# Patient Record
Sex: FEMALE | Race: WHITE | NOT HISPANIC OR LATINO | Employment: STUDENT | URBAN - METROPOLITAN AREA
[De-identification: names, ages, dates, MRNs, and addresses within clinical notes are randomized per-mention and may not be internally consistent; named-entity substitution may affect disease eponyms.]

---
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
## 2019-02-18 ENCOUNTER — TELEPHONE (OUTPATIENT)
Dept: ENDOCRINOLOGY | Facility: CLINIC | Age: 19
End: 2019-02-18

## 2019-02-21 NOTE — TELEPHONE ENCOUNTER
[facility]    Phone Message    May a detailed message be left on voicemail: yes    Reason for Call: Other: pt's mother called and stated to give the pt a call back instead. Thanks.     Action Taken: Message routed to:  [facility]): Endo/Nina

## 2019-02-21 NOTE — TELEPHONE ENCOUNTER
[facility]    Phone Message    May a detailed message be left on voicemail: yes    Reason for Call: Other: Pt's mother, Mary called and requested a nurse to give her a call back. Please call back Mary. Thanks.     Action Taken: Message routed to:  [facility] Endo/brigette

## 2019-02-21 NOTE — TELEPHONE ENCOUNTER
Jerrell was asking for a prescription  for yeast infection. She was notified to contact her PCP . She may need to be seen for culture, .

## 2019-03-27 ENCOUNTER — OFFICE VISIT (OUTPATIENT)
Dept: ENDOCRINOLOGY | Facility: CLINIC | Age: 19
End: 2019-03-27
Payer: COMMERCIAL

## 2019-03-27 VITALS
BODY MASS INDEX: 26.92 KG/M2 | HEART RATE: 96 BPM | WEIGHT: 171.9 LBS | SYSTOLIC BLOOD PRESSURE: 105 MMHG | DIASTOLIC BLOOD PRESSURE: 67 MMHG

## 2019-03-27 DIAGNOSIS — E10.9 TYPE 1 DIABETES MELLITUS WITHOUT COMPLICATION (H): Primary | ICD-10-CM

## 2019-03-27 DIAGNOSIS — E10.9 TYPE 1 DIABETES MELLITUS WITHOUT COMPLICATION (H): ICD-10-CM

## 2019-03-27 LAB
CREAT UR-MCNC: 134 MG/DL
FERRITIN SERPL-MCNC: 16 NG/ML (ref 12–150)
MICROALBUMIN UR-MCNC: 8 MG/L
MICROALBUMIN/CREAT UR: 5.61 MG/G CR (ref 0–25)

## 2019-03-27 ASSESSMENT — PATIENT HEALTH QUESTIONNAIRE - PHQ9: SUM OF ALL RESPONSES TO PHQ QUESTIONS 1-9: 6

## 2019-03-27 ASSESSMENT — PAIN SCALES - GENERAL: PAINLEVEL: NO PAIN (0)

## 2019-03-27 NOTE — PROGRESS NOTES
Endocrinology Clinic Visit 19  NAME:  Jerrell Hernandez  PCP:  Freddie Iglesias  MRN:  [id number]    Chief Complaint     Chief Complaint   Patient presents with     Consult     DM TYPE 1       History of Present Illness     Jerrell Hernandez is a 19 year old female who is seen in clinic for diabetes management.     She was diagnosed with type 1 diabetes at age 9. On insulin pump since age 10. Last couple of years she has had elevations in A1c related to being in college and not so diligent with diabetes control. A1c was down to 8's before college then went up. Gained 15 lbs in college.  He started on the Dex com G5 in 2018.      Complications:   No retinopathy - last eye exam 2018  No neuropathy  No known nephropathy. Last urine microalbumin neg >2 yrs ago    Interval History:   She only used the DEXCOM for few weeks then stopped using it due to being too busy in college, etc... She is not checking her BG.    Associated Signs/Symptoms  Hypoglycemia: no. Hyperglycemia: none.Neuropathy: none. Vascular Symtpoms: none. Angina/CHF: none. Ulcers: No. Amputations: No    Current treatment strategy: Insulin pump: Medtronic MiniMed at following settings:   BASAL RATES and times:   00:00 1.5 units/hour    08:00 1.5 units/hour    12:00 1.9 units/hour    22:00 1.5 units/hour   CARB RATIO and times:   00:00 8   06:00 5.5   10:00 5.5   21:00 6.5  Correction factor (Sensitivity and times):    00:00 50   06:00 40   21:00 50  BLOOD GLUCOSE TARGET and times:   00:00    06:00    22:00     Blood Glucose Monitoring: Meter downloaded today in clinic and data reviewed as such:  Average B  0-1 checks per day  Using 46 units of insulin per day, 95% basal    Diet: no schedule. Counts carbs.     Exercise: dances 2 times a week, works at .    Weight:   Wt Readings from Last 4 Encounters:   19 78 kg (171 lb 14.4 oz)   18 75.7 kg (166 lb 12.8 oz) (91 %)*   18 82.2 kg (181 lb  "3.2 oz) (95 %)*   12/15/16 78 kg (171 lb 15.3 oz) (94 %)*     * Growth percentiles are based on CDC (Girls, 2-20 Years) data.     Problem List     Patient Active Problem List   Diagnosis     Uncontrolled type 1 diabetes mellitus with hyperglycemia (H)        Medications     Current Outpatient Medications   Medication     acetone, Urine, test (KETOSTIX) STRP     blood glucose monitoring (ARLINE CONTOUR NEXT) test strip     blood glucose monitoring (ONETOUCH ULTRA) test strip     Continuous Blood Gluc  (DEXCOM  KIT) RUSTAM     Continuous Blood Gluc Sensor (DEXCOM G5 MOB/G4 PLAT SENSOR) MISC     Continuous Blood Gluc Transmit (DEXCOM G5 MOBILE TRANSMITTER) MISC     ferrous sulfate (IRON) 325 (65 FE) MG tablet     fluconazole (DIFLUCAN) 150 MG tablet     fluconazole (DIFLUCAN) 150 MG tablet     glucagon (GLUCAGON EMERGENCY) 1 MG injection     glucagon (GLUCAGON EMERGENCY) 1 MG injection     glucagon (GLUCAGON EMERGENCY) 1 MG kit     insulin lispro (HUMALOG) 100 UNIT/ML injection     insulin pen needle (BD PEN NEEDLE OPAL U/F) 32G X 4 MM MISC     insulin syringe-needle U-100 (BD INSULIN SYRINGE ULTRAFINE) 31G X 5/16\" 0.3 ML     MINIMED INSULIN INFUSION PUMP     norgestimate-ethinyl estradiol (ORTHO-CYCLEN, SPRINTEC) 0.25-35 MG-MCG per tablet     No current facility-administered medications for this visit.         Allergies     Allergies   Allergen Reactions     Amoxicillin      Zithromax [Azithromycin]        Medical / Surgical History     Past Medical History:   Diagnosis Date     Chronic generalized abdominal pain     constipation     Type I (juvenile type) diabetes mellitus without mention of complication, uncontrolled 2009    previosuly followed at [facility]     Past Surgical History:   Procedure Laterality Date     ADENOIDECTOMY       APPENDECTOMY  2009     SINUS SURGERY         Social History     Social History     Socioeconomic History     Marital status: Single     Spouse name: Not on file     Number " of children: Not on file     Years of education: Not on file     Highest education level: Not on file   Occupational History     Not on file   Social Needs     Financial resource strain: Not on file     Food insecurity:     Worry: Not on file     Inability: Not on file     Transportation needs:     Medical: Not on file     Non-medical: Not on file   Tobacco Use     Smoking status: Never Smoker     Smokeless tobacco: Never Used   Substance and Sexual Activity     Alcohol use: Not on file     Drug use: Not on file     Sexual activity: Not on file   Lifestyle     Physical activity:     Days per week: Not on file     Minutes per session: Not on file     Stress: Not on file   Relationships     Social connections:     Talks on phone: Not on file     Gets together: Not on file     Attends Caodaism service: Not on file     Active member of club or organization: Not on file     Attends meetings of clubs or organizations: Not on file     Relationship status: Not on file     Intimate partner violence:     Fear of current or ex partner: Not on file     Emotionally abused: Not on file     Physically abused: Not on file     Forced sexual activity: Not on file   Other Topics Concern     Not on file   Social History Narrative    Grade 8th grade ([id number])    [employer]    Interested in [employer]       Family History     Family History   Problem Relation Age of Onset     Diabetes Other         2nd cousin     Thyroid Disease Paternal Grandmother         hypothyroidism     Gastrointestinal Disease No family hx of         celiac or inflammatory bowel disease       ROS     Constitutional: no fevers, chills, night sweats. No weight loss or fatigue. Good appetite  Eyes: no vision changes, no eye redness, no diplopia  Ears, Nose, mouth, throat: no hearing changes, no tinnitus, no rhinorrhea, no nasal congestion  Cardiovascular: no chest pain, no orthopnea or PND, no edema, no palpitations  Respiratory: no dyspnea, no cough, no sputum,  no wheezing  Gastrointestinal: no nausea, no vomiting, no abdominal pain, no diarrhea, no constipation  Genitourinary: no dysuria, no frequency, no urgency, no nocturia  Musculoskeletal: no joint pains, no back pain, no cramps, no fractures  Skin: no rash, no itching, no dryness, no ulcers, no hair loss  Neurological: no headache, no weakness, no numbness, no dizziness, no tremors  Psychiatric: no anxiety, no sadness  Hematologic/lymphatic: no easy bruising, no bleeding, no palor    Physical Exam   Wt 78 kg (171 lb 14.4 oz)   BMI 26.92 kg/m       General: Comfortable, no obvious distress, normal body habitus  Eyes: Sclera anicteric, moist conjunctiva  HENT: Atraumatic, oropharynx clear, moist mucous membranes with no mucosal ulcerations  Neck: Trachea midline, supple. Thyroid: Thyroid is normal in size and texture  CV: Regular rhythm, normal rate. No murmurs auscultated  Resp: Clear to auscultation bilaterally, good effort  Abdomen:  Soft, non tender, non distended. Bowel sounds heard. No organomegaly.  Skin: No rashes, lesions, or subcutaneous nodules.   Psych: Alert and oriented x 3. Appropriate affect, good insight  Extremities: No peripheral edema  Musculoskeletal: Appropriate muscle bulk and strength  Lymphatic: No cervical lymphadenopathy  Neuro: Moves all four extremities. No focal deficits on limited exam. Gait normal.       Labs/Imaging and Outside Records     Pertinent Labs were reviewed and updated in EPIC.    Summary of recent findings:   Lab Results   Component Value Date    A1C 10.0 12/15/2016    A1C 10 08/04/2016    A1C 8.9 05/19/2016    A1C 10.2 02/18/2016    A1C 8.8 09/17/2015       TSH   Date Value Ref Range Status   03/30/2018 1.95 0.40 - 4.00 mU/L Final   12/15/2016 1.91 0.40 - 4.00 mU/L Final   08/04/2016 3.22 0.40 - 4.00 mU/L Final   07/30/2015 2.58 0.40 - 4.00 mU/L Final   12/04/2014 1.80 0.40 - 4.00 mU/L Final     Comment:     Effective 7/30/2014, the reference range for this assay has  changed to reflect   new instrumentation/methodology.         Creatinine   Date Value Ref Range Status   03/30/2018 0.65 0.50 - 1.00 mg/dL Final       Recent Labs   Lab Test  12/04/14   1714  10/25/12   1420   CHOL  154  173   HDL  33*  38*   LDL  78  90   TRIG  217*  231*   CHOLHDLRATIO  4.7  4.6       Impression / Plan     1. Diabetes Mellitus: Type 1  Current glycemic control can be considered poor.  Poor compliance with FSBG monitoring.   Has not been using her DEXCOM    She is requesting help with restarting DEXCOM  I will contact our CDE  No changes in pump today  Advised more compliance      2. Diabetes Complications: With none.     3. Blood Pressure Management: Blood pressure is controlled. Currently is not on pharmacotherapy for this.     4.Lipid Management: Per the new ACC/KUNAL/NHLBI guidelines, statins are recommended for individuals with diabetes aged 40-75 with LDL  without ASCVD, and for any individual with ASCVD. Currently the patient is not on a statin.     5. Smoking Status: Patient Pt is smoke free..         Follow up: 3 months.       Sabiha Parker MD  Endocrinology, Diabetes and Metabolism  [facility]

## 2019-03-27 NOTE — PATIENT INSTRUCTIONS
1- schedule 3 month follow up with me and CDE on same day    Sabiha Parker MD  Endocrinology, Diabetes and Metabolism  [facility]

## 2019-03-27 NOTE — LETTER
3/27/2019       RE: Jerrell Hernandez  [address] [place] [address]     Dear Colleague,    Thank you for referring your patient, Jerrell Hernandez, to the [facility] ENDOCRINOLOGY at [facility]. Please see a copy of my visit note below.    Endocrinology Clinic Visit 19  NAME:  Jerrell Hernandez  PCP:  Freddie Iglesias  MRN:  [id number]    Chief Complaint     Chief Complaint   Patient presents with     Consult     DM TYPE 1       History of Present Illness     Jerrell Hernandez is a 19 year old female who is seen in clinic for diabetes management.     She was diagnosed with type 1 diabetes at age 9. On insulin pump since age 10. Last couple of years she has had elevations in A1c related to being in college and not so diligent with diabetes control. A1c was down to 8's before college then went up. Gained 15 lbs in college.  He started on the Dex com G5 in 2018.      Complications:   No retinopathy - last eye exam 2018  No neuropathy  No known nephropathy. Last urine microalbumin neg >2 yrs ago    Interval History:   She only used the DEXCOM for few weeks then stopped using it due to being too busy in college, etc... She is not checking her BG.    Associated Signs/Symptoms  Hypoglycemia: no. Hyperglycemia: none.Neuropathy: none. Vascular Symtpoms: none. Angina/CHF: none. Ulcers: No. Amputations: No    Current treatment strategy: Insulin pump: Medtronic MiniMed at following settings:   BASAL RATES and times:   00:00 1.5 units/hour    08:00 1.5 units/hour    12:00 1.9 units/hour    22:00 1.5 units/hour   CARB RATIO and times:   00:00 8   06:00 5.5   10:00 5.5   21:00 6.5  Correction factor (Sensitivity and times):    00:00 50   06:00 40   21:00 50  BLOOD GLUCOSE TARGET and times:   00:00    06:00    22:00     Blood Glucose Monitoring: Meter downloaded today in clinic and data reviewed as such:  Average B  0-1 checks per  "day  Using 46 units of insulin per day, 95% basal    Diet: no schedule. Counts carbs.     Exercise: dances 2 times a week, works at [employer].    Weight:   Wt Readings from Last 4 Encounters:   03/27/19 78 kg (171 lb 14.4 oz)   08/29/18 75.7 kg (166 lb 12.8 oz) (91 %)*   03/30/18 82.2 kg (181 lb 3.2 oz) (95 %)*   12/15/16 78 kg (171 lb 15.3 oz) (94 %)*     * Growth percentiles are based on [facility] (Girls, 2-20 Years) data.     Problem List     Patient Active Problem List   Diagnosis     Uncontrolled type 1 diabetes mellitus with hyperglycemia (H)        Medications     Current Outpatient Medications   Medication     acetone, Urine, test (KETOSTIX) STRP     blood glucose monitoring ([employer] CONTOUR NEXT) test strip     blood glucose monitoring (ONETOUCH ULTRA) test strip     Continuous Blood Gluc  (DEXCOM  KIT) RUSTAM     Continuous Blood Gluc Sensor (DEXCOM G5 MOB/G4 PLAT SENSOR) MISC     Continuous Blood Gluc Transmit (DEXCOM G5 MOBILE TRANSMITTER) MISC     ferrous sulfate (IRON) 325 (65 FE) MG tablet     fluconazole (DIFLUCAN) 150 MG tablet     fluconazole (DIFLUCAN) 150 MG tablet     glucagon (GLUCAGON EMERGENCY) 1 MG injection     glucagon (GLUCAGON EMERGENCY) 1 MG injection     glucagon (GLUCAGON EMERGENCY) 1 MG kit     insulin lispro (HUMALOG) 100 UNIT/ML injection     insulin pen needle (BD PEN NEEDLE OPAL U/F) 32G X 4 MM MISC     insulin syringe-needle U-100 (BD INSULIN SYRINGE ULTRAFINE) 31G X 5/16\" 0.3 ML     MINIMED INSULIN INFUSION PUMP     norgestimate-ethinyl estradiol (ORTHO-CYCLEN, SPRINTEC) 0.25-35 MG-MCG per tablet     No current facility-administered medications for this visit.         Allergies     Allergies   Allergen Reactions     Amoxicillin      Zithromax [Azithromycin]        Medical / Surgical History     Past Medical History:   Diagnosis Date     Chronic generalized abdominal pain     constipation     Type I (juvenile type) diabetes mellitus without mention of complication, " uncontrolled 2009    previosuly followed at [facility]     Past Surgical History:   Procedure Laterality Date     ADENOIDECTOMY       APPENDECTOMY  2009     SINUS SURGERY         Social History     Social History     Socioeconomic History     Marital status: Single     Spouse name: Not on file     Number of children: Not on file     Years of education: Not on file     Highest education level: Not on file   Occupational History     Not on file   Social Needs     Financial resource strain: Not on file     Food insecurity:     Worry: Not on file     Inability: Not on file     Transportation needs:     Medical: Not on file     Non-medical: Not on file   Tobacco Use     Smoking status: Never Smoker     Smokeless tobacco: Never Used   Substance and Sexual Activity     Alcohol use: Not on file     Drug use: Not on file     Sexual activity: Not on file   Lifestyle     Physical activity:     Days per week: Not on file     Minutes per session: Not on file     Stress: Not on file   Relationships     Social connections:     Talks on phone: Not on file     Gets together: Not on file     Attends Worship service: Not on file     Active member of club or organization: Not on file     Attends meetings of clubs or organizations: Not on file     Relationship status: Not on file     Intimate partner violence:     Fear of current or ex partner: Not on file     Emotionally abused: Not on file     Physically abused: Not on file     Forced sexual activity: Not on file   Other Topics Concern     Not on file   Social History Narrative    Grade 8th grade ([id number])    [employer]    Interested in Drama       Family History     Family History   Problem Relation Age of Onset     Diabetes Other         2nd cousin     Thyroid Disease Paternal Grandmother         hypothyroidism     Gastrointestinal Disease No family hx of         celiac or inflammatory bowel disease       ROS     Constitutional: no fevers, chills, night sweats. No weight  loss or fatigue. Good appetite  Eyes: no vision changes, no eye redness, no diplopia  Ears, Nose, mouth, throat: no hearing changes, no tinnitus, no rhinorrhea, no nasal congestion  Cardiovascular: no chest pain, no orthopnea or PND, no edema, no palpitations  Respiratory: no dyspnea, no cough, no sputum, no wheezing  Gastrointestinal: no nausea, no vomiting, no abdominal pain, no diarrhea, no constipation  Genitourinary: no dysuria, no frequency, no urgency, no nocturia  Musculoskeletal: no joint pains, no back pain, no cramps, no fractures  Skin: no rash, no itching, no dryness, no ulcers, no hair loss  Neurological: no headache, no weakness, no numbness, no dizziness, no tremors  Psychiatric: no anxiety, no sadness  Hematologic/lymphatic: no easy bruising, no bleeding, no palor    Physical Exam   Wt 78 kg (171 lb 14.4 oz)   BMI 26.92 kg/m        General: Comfortable, no obvious distress, normal body habitus  Eyes: Sclera anicteric, moist conjunctiva  HENT: Atraumatic, oropharynx clear, moist mucous membranes with no mucosal ulcerations  Neck: Trachea midline, supple. Thyroid: Thyroid is normal in size and texture  CV: Regular rhythm, normal rate. No murmurs auscultated  Resp: Clear to auscultation bilaterally, good effort  Abdomen:  Soft, non tender, non distended. Bowel sounds heard. No organomegaly.  Skin: No rashes, lesions, or subcutaneous nodules.   Psych: Alert and oriented x 3. Appropriate affect, good insight  Extremities: No peripheral edema  Musculoskeletal: Appropriate muscle bulk and strength  Lymphatic: No cervical lymphadenopathy  Neuro: Moves all four extremities. No focal deficits on limited exam. Gait normal.       Labs/Imaging and Outside Records     Pertinent Labs were reviewed and updated in EPIC.    Summary of recent findings:   Lab Results   Component Value Date    A1C 10.0 12/15/2016    A1C 10 08/04/2016    A1C 8.9 05/19/2016    A1C 10.2 02/18/2016    A1C 8.8 09/17/2015       TSH   Date  Value Ref Range Status   03/30/2018 1.95 0.40 - 4.00 mU/L Final   12/15/2016 1.91 0.40 - 4.00 mU/L Final   08/04/2016 3.22 0.40 - 4.00 mU/L Final   07/30/2015 2.58 0.40 - 4.00 mU/L Final   12/04/2014 1.80 0.40 - 4.00 mU/L Final     Comment:     Effective 7/30/2014, the reference range for this assay has changed to reflect   new instrumentation/methodology.         Creatinine   Date Value Ref Range Status   03/30/2018 0.65 0.50 - 1.00 mg/dL Final       Recent Labs   Lab Test  12/04/14   1714  10/25/12   1420   CHOL  154  173   HDL  33*  38*   LDL  78  90   TRIG  217*  231*   CHOLHDLRATIO  4.7  4.6       Impression / Plan     1. Diabetes Mellitus: Type 1  Current glycemic control can be considered poor.  Poor compliance with FSBG monitoring.   Has not been using her DEXCOM    She is requesting help with restarting DEXCOM  I will contact our CDE  No changes in pump today  Advised more compliance    2. Diabetes Complications: With none.     3. Blood Pressure Management: Blood pressure is controlled. Currently is not on pharmacotherapy for this.     4.Lipid Management: Per the new ACC/KUNAL/NHLBI guidelines, statins are recommended for individuals with diabetes aged 40-75 with LDL  without ASCVD, and for any individual with ASCVD. Currently the patient is not on a statin.     5. Smoking Status: Patient Pt is smoke free..     Follow up: 3 months.     Sabiha Parker MD  Endocrinology, Diabetes and Metabolism  [facility]

## 2019-04-10 ENCOUNTER — TELEPHONE (OUTPATIENT)
Dept: EDUCATION SERVICES | Facility: CLINIC | Age: 19
End: 2019-04-10

## 2019-04-10 NOTE — TELEPHONE ENCOUNTER
Phone call to patient:  She states she's got the sensor figured out.  Her transmitter is dead and she needs to order a new one.  Asked if she needs help with this and she states she does not.  She just needs to call them. Advised to call back if she has any trouble with this. Cesia Mccauley RN,CDE

## 2019-05-12 ENCOUNTER — HOSPITAL ENCOUNTER (EMERGENCY)
Facility: CLINIC | Age: 19
Discharge: HOME | End: 2019-05-12
Attending: EMERGENCY MEDICINE | Admitting: EMERGENCY MEDICINE
Payer: COMMERCIAL

## 2019-05-12 VITALS
DIASTOLIC BLOOD PRESSURE: 81 MMHG | TEMPERATURE: 98.3 F | HEIGHT: 67 IN | BODY MASS INDEX: 26.92 KG/M2 | RESPIRATION RATE: 18 BRPM | OXYGEN SATURATION: 99 % | SYSTOLIC BLOOD PRESSURE: 135 MMHG | HEART RATE: 99 BPM

## 2019-05-12 DIAGNOSIS — R11.2 NAUSEA VOMITING AND DIARRHEA: ICD-10-CM

## 2019-05-12 DIAGNOSIS — R19.7 NAUSEA VOMITING AND DIARRHEA: ICD-10-CM

## 2019-05-12 LAB
ALBUMIN SERPL-MCNC: 4.2 G/DL (ref 3.4–5)
ALBUMIN UR-MCNC: 30 MG/DL
ALP SERPL-CCNC: 89 U/L (ref 40–150)
ALT SERPL W P-5'-P-CCNC: 19 U/L (ref 0–50)
ANION GAP SERPL CALCULATED.3IONS-SCNC: 7 MMOL/L (ref 3–14)
APPEARANCE UR: CLEAR
AST SERPL W P-5'-P-CCNC: 7 U/L (ref 0–45)
B-HCG FREE SERPL-ACNC: <5 IU/L
BASE EXCESS BLDV CALC-SCNC: 0.9 MMOL/L
BASOPHILS # BLD AUTO: 0 10E9/L (ref 0–0.2)
BASOPHILS NFR BLD AUTO: 0.4 %
BILIRUB SERPL-MCNC: 1.4 MG/DL (ref 0.2–1.3)
BILIRUB UR QL STRIP: NEGATIVE
BUN SERPL-MCNC: 14 MG/DL (ref 7–30)
CALCIUM SERPL-MCNC: 8.9 MG/DL (ref 8.5–10.1)
CHLORIDE SERPL-SCNC: 109 MMOL/L (ref 94–109)
CO2 SERPL-SCNC: 27 MMOL/L (ref 20–32)
COLOR UR AUTO: YELLOW
CREAT SERPL-MCNC: 0.68 MG/DL (ref 0.52–1.04)
DIFFERENTIAL METHOD BLD: ABNORMAL
EOSINOPHIL # BLD AUTO: 0 10E9/L (ref 0–0.7)
EOSINOPHIL NFR BLD AUTO: 0.3 %
ERYTHROCYTE [DISTWIDTH] IN BLOOD BY AUTOMATED COUNT: 12.7 % (ref 10–15)
GFR SERPL CREATININE-BSD FRML MDRD: >90 ML/MIN/{1.73_M2}
GLUCOSE BLDC GLUCOMTR-MCNC: 155 MG/DL (ref 70–99)
GLUCOSE SERPL-MCNC: 170 MG/DL (ref 70–99)
GLUCOSE UR STRIP-MCNC: 70 MG/DL
HCO3 BLDV-SCNC: 27 MMOL/L (ref 21–28)
HCT VFR BLD AUTO: 47.1 % (ref 35–47)
HGB BLD-MCNC: 16.5 G/DL (ref 11.7–15.7)
HGB UR QL STRIP: NEGATIVE
HYALINE CASTS #/AREA URNS LPF: 1 /LPF (ref 0–2)
IMM GRANULOCYTES # BLD: 0 10E9/L (ref 0–0.4)
IMM GRANULOCYTES NFR BLD: 0.2 %
KETONES BLD-SCNC: 0.6 MMOL/L (ref 0–0.6)
KETONES UR STRIP-MCNC: 40 MG/DL
LACTOFERRIN STL QL IA: POSITIVE
LEUKOCYTE ESTERASE UR QL STRIP: ABNORMAL
LIPASE SERPL-CCNC: 38 U/L (ref 73–393)
LYMPHOCYTES # BLD AUTO: 0.6 10E9/L (ref 0.8–5.3)
LYMPHOCYTES NFR BLD AUTO: 6 %
MCH RBC QN AUTO: 29 PG (ref 26.5–33)
MCHC RBC AUTO-ENTMCNC: 35 G/DL (ref 31.5–36.5)
MCV RBC AUTO: 83 FL (ref 78–100)
MONOCYTES # BLD AUTO: 0.7 10E9/L (ref 0–1.3)
MONOCYTES NFR BLD AUTO: 6.8 %
MUCOUS THREADS #/AREA URNS LPF: PRESENT /LPF
NEUTROPHILS # BLD AUTO: 9 10E9/L (ref 1.6–8.3)
NEUTROPHILS NFR BLD AUTO: 86.3 %
NITRATE UR QL: NEGATIVE
NRBC # BLD AUTO: 0 10*3/UL
NRBC BLD AUTO-RTO: 0 /100
PCO2 BLDV: 45 MM HG (ref 40–50)
PH BLDV: 7.38 PH (ref 7.32–7.43)
PH UR STRIP: 6 PH (ref 5–7)
PLATELET # BLD AUTO: 245 10E9/L (ref 150–450)
PO2 BLDV: 38 MM HG (ref 25–47)
POTASSIUM SERPL-SCNC: 3.6 MMOL/L (ref 3.4–5.3)
PROT SERPL-MCNC: 7.7 G/DL (ref 6.8–8.8)
RBC # BLD AUTO: 5.68 10E12/L (ref 3.8–5.2)
RBC #/AREA URNS AUTO: 1 /HPF (ref 0–2)
SODIUM SERPL-SCNC: 143 MMOL/L (ref 133–144)
SOURCE: ABNORMAL
SP GR UR STRIP: 1.02 (ref 1–1.03)
SQUAMOUS #/AREA URNS AUTO: 4 /HPF (ref 0–1)
UROBILINOGEN UR STRIP-MCNC: NORMAL MG/DL (ref 0–2)
WBC # BLD AUTO: 10.4 10E9/L (ref 4–11)
WBC #/AREA URNS AUTO: 4 /HPF (ref 0–5)

## 2019-05-12 PROCEDURE — 81001 URINALYSIS AUTO W/SCOPE: CPT | Performed by: EMERGENCY MEDICINE

## 2019-05-12 PROCEDURE — 82010 KETONE BODYS QUAN: CPT | Performed by: EMERGENCY MEDICINE

## 2019-05-12 PROCEDURE — 00000146 ZZHCL STATISTIC GLUCOSE BY METER IP

## 2019-05-12 PROCEDURE — 83690 ASSAY OF LIPASE: CPT | Performed by: EMERGENCY MEDICINE

## 2019-05-12 PROCEDURE — 85025 COMPLETE CBC W/AUTO DIFF WBC: CPT | Performed by: EMERGENCY MEDICINE

## 2019-05-12 PROCEDURE — 25000132 ZZH RX MED GY IP 250 OP 250 PS 637: Performed by: EMERGENCY MEDICINE

## 2019-05-12 PROCEDURE — 84702 CHORIONIC GONADOTROPIN TEST: CPT

## 2019-05-12 PROCEDURE — 25000128 H RX IP 250 OP 636: Performed by: EMERGENCY MEDICINE

## 2019-05-12 PROCEDURE — 25800030 ZZH RX IP 258 OP 636: Performed by: EMERGENCY MEDICINE

## 2019-05-12 PROCEDURE — 99284 EMERGENCY DEPT VISIT MOD MDM: CPT | Mod: 25

## 2019-05-12 PROCEDURE — 96374 THER/PROPH/DIAG INJ IV PUSH: CPT

## 2019-05-12 PROCEDURE — 82803 BLOOD GASES ANY COMBINATION: CPT | Performed by: EMERGENCY MEDICINE

## 2019-05-12 PROCEDURE — 80053 COMPREHEN METABOLIC PANEL: CPT | Performed by: EMERGENCY MEDICINE

## 2019-05-12 PROCEDURE — 96361 HYDRATE IV INFUSION ADD-ON: CPT

## 2019-05-12 PROCEDURE — 87493 C DIFF AMPLIFIED PROBE: CPT | Performed by: EMERGENCY MEDICINE

## 2019-05-12 PROCEDURE — 87506 IADNA-DNA/RNA PROBE TQ 6-11: CPT | Performed by: EMERGENCY MEDICINE

## 2019-05-12 PROCEDURE — 83630 LACTOFERRIN FECAL (QUAL): CPT | Performed by: EMERGENCY MEDICINE

## 2019-05-12 RX ORDER — METOCLOPRAMIDE 10 MG/1
10 TABLET ORAL 3 TIMES DAILY PRN
Qty: 20 TABLET | Refills: 0 | Status: SHIPPED | OUTPATIENT
Start: 2019-05-12 | End: 2021-11-18

## 2019-05-12 RX ORDER — DICYCLOMINE HCL 20 MG
20 TABLET ORAL ONCE
Status: COMPLETED | OUTPATIENT
Start: 2019-05-12 | End: 2019-05-12

## 2019-05-12 RX ORDER — DICYCLOMINE HCL 20 MG
20 TABLET ORAL EVERY 6 HOURS
Qty: 30 TABLET | Refills: 0 | Status: SHIPPED | OUTPATIENT
Start: 2019-05-12 | End: 2021-11-18

## 2019-05-12 RX ORDER — ONDANSETRON 2 MG/ML
4 INJECTION INTRAMUSCULAR; INTRAVENOUS EVERY 30 MIN PRN
Status: DISCONTINUED | OUTPATIENT
Start: 2019-05-12 | End: 2019-05-13 | Stop reason: HOSPADM

## 2019-05-12 RX ORDER — TRAMADOL HYDROCHLORIDE 50 MG/1
50-100 TABLET ORAL EVERY 6 HOURS PRN
Qty: 16 TABLET | Refills: 0 | Status: SHIPPED | OUTPATIENT
Start: 2019-05-12 | End: 2019-05-15

## 2019-05-12 RX ORDER — SODIUM CHLORIDE 9 MG/ML
1000 INJECTION, SOLUTION INTRAVENOUS CONTINUOUS
Status: DISCONTINUED | OUTPATIENT
Start: 2019-05-12 | End: 2019-05-13 | Stop reason: HOSPADM

## 2019-05-12 RX ORDER — ONDANSETRON 4 MG/1
4 TABLET, ORALLY DISINTEGRATING ORAL EVERY 6 HOURS PRN
Qty: 20 TABLET | Refills: 0 | Status: SHIPPED | OUTPATIENT
Start: 2019-05-12 | End: 2021-11-18

## 2019-05-12 RX ADMIN — DICYCLOMINE HYDROCHLORIDE 20 MG: 20 TABLET ORAL at 21:23

## 2019-05-12 RX ADMIN — SODIUM CHLORIDE 1000 ML: 9 INJECTION, SOLUTION INTRAVENOUS at 19:06

## 2019-05-12 RX ADMIN — ONDANSETRON 4 MG: 2 INJECTION INTRAMUSCULAR; INTRAVENOUS at 20:53

## 2019-05-12 RX ADMIN — SODIUM CHLORIDE 1000 ML: 9 INJECTION, SOLUTION INTRAVENOUS at 21:21

## 2019-05-12 NOTE — ED AVS SNAPSHOT
Emergency Department  [address] [facility] [address]  Phone:  [phone]  Fax:  [phone]                                    Jerrell Hernandez   MRN: [id number]    Department:   Emergency Department   Date of Visit:  5/12/2019           After Visit Summary Signature Page    I have received my discharge instructions, and my questions have been answered. I have discussed any challenges I see with this plan with the nurse or doctor.    ..........................................................................................................................................  Patient/Patient Representative Signature      ..........................................................................................................................................  Patient Representative Print Name and Relationship to Patient    ..................................................               ................................................  Date                                   Time    ..........................................................................................................................................  Reviewed by Signature/Title    ...................................................              ..............................................  Date                                               Time          22EPIC Rev 08/18

## 2019-05-13 ENCOUNTER — TELEPHONE (OUTPATIENT)
Dept: EMERGENCY MEDICINE | Facility: CLINIC | Age: 19
End: 2019-05-13

## 2019-05-13 LAB
C COLI+JEJUNI+LARI FUSA STL QL NAA+PROBE: NOT DETECTED
C DIFF TOX B STL QL: NEGATIVE
EC STX1 GENE STL QL NAA+PROBE: NOT DETECTED
EC STX2 GENE STL QL NAA+PROBE: NOT DETECTED
ENTERIC PATHOGEN COMMENT: ABNORMAL
NOROV GI+II ORF1-ORF2 JNC STL QL NAA+PR: ABNORMAL
RVA NSP5 STL QL NAA+PROBE: NOT DETECTED
SALMONELLA SP RPOD STL QL NAA+PROBE: NOT DETECTED
SHIGELLA SP+EIEC IPAH STL QL NAA+PROBE: NOT DETECTED
SPECIMEN SOURCE: NORMAL
V CHOL+PARA RFBL+TRKH+TNAA STL QL NAA+PR: NOT DETECTED
Y ENTERO RECN STL QL NAA+PROBE: NOT DETECTED

## 2019-05-13 NOTE — TELEPHONE ENCOUNTER
"[facility] Emergency Department Lab result notification:    [place] ED lab result protocol used  Norovirus I and II    Reason for call  Notify of lab results, assess symptoms,  review ED providers recommendations/discharge instructions (if necessary) and advise per ED lab result f/u protocol    Lab Result   Final Enteric Bacteria and Virus Panel by [facility] Stool is POSITIVE for Norovirus I and II by [facility]  Patient to be notified, symptom's assessed and advised per [place] ED lab result protocol.  Information table from ED Provider visit on 5/12/19  Symptoms reported at ED visit (Chief complaint, HPI) Nausea, Vomiting, & Diarrhea (Intermittent for past month with abdominal pain. Blood sugars running in the 200's per pt.)        HPI   Jerrell Hernandez is a 19 year old female who presents to the ED via self/mother w/ a cc of nausea, vomiting, diarrhea onset this morning.  Patient reports that she has had diarrhea for the last 2 to 3 months, nonbloody.  She reports her symptoms have improved over the last 1 week with only having approximately 3 loose bowel movements per day but then worsened again this morning.  She reports diffuse, moderate, crampy, nonradiating abdominal pain which is somewhat alleviated after she vomits or has a bowel movement.  She denies chest pain, shortness of breath, fevers, chills, changes in urination.  She reports minimal vaginal discharge stating that she is likely about to menstruate.  She denies any other vaginal discharge   ED providers Impression and Plan (applicable information) Provider note incomplete   Miscellaneous information NA     RN Assessment (Patient s current Symptoms), include time called.  [Insert Left message here if message left]  12:50PM: Patient calling for lab results. States that she is feeling alittle better after the IV in the ED yesterday. Continues to have diarrhea stools, less in frequency, no blood noted. Blood sugars have been \"ok\". Has been " taking in fluids and is urinating normally. No vomiting.  RN Recommendations/Instructions per [place] ED lab result protocol  Patient notified of lab result and treatment recommendation.   Reviewed Norovirus information per protocol, Patient education.   Also given fecal lactoferrin result.   Advised to follow up with PCP as directed by the ED provider.  Patient and mom are comfortable with the plan of care and have no further questions, they will be contacting the Patient's PCP today.     Please Contact your PCP clinic or return to the Emergency department if your:    Symptoms worsen or other concerning symptom's.    PCP follow-up Questions asked: YES       [RN Name]  Ann Gutierres RN  [place] [facility] RN  Lung Nodule and ED Lab Result RN  Epic pool (ED late result f/u RN): [id number]  [facility] INCIDENTAL RADIOLOGY F/U NURSES: P [id number]  # [phone]      Copy of Lab result

## 2019-05-13 NOTE — DISCHARGE INSTRUCTIONS
Take the below medications as prescribed.  Please do not miss any doses.    New Prescriptions    DICYCLOMINE (BENTYL) 20 MG TABLET    Take 1 tablet (20 mg) by mouth every 6 hours    METOCLOPRAMIDE (REGLAN) 10 MG TABLET    Take 1 tablet (10 mg) by mouth 3 times daily as needed (Nausea or Vomiting)    ONDANSETRON (ZOFRAN ODT) 4 MG ODT TAB    Take 1 tablet (4 mg) by mouth every 6 hours as needed    TRAMADOL (ULTRAM) 50 MG TABLET    Take 1-2 tablets ( mg) by mouth every 6 hours as needed for severe pain         1. -Take acetaminophen 500 to 1000 mg by mouth every 4 to 6 hours as needed for pain or fever.  Do not take more than 4000 mg in 24 hours.  Do not take within 6 hours of another acetaminophen containing medication such as norco (vicodin) or percocet.  2. Drink plenty of fluids such as diluted gatorade.  3. Do not use antidiarrheal medications until 3 to 4 days after symptoms have been present.  4. Please follow-up with your primary care doctor if your symptoms persist after you return home.  5. Please return to the ED as needed for new or worsening symptoms such as fainting, vomiting and unable to keep anything down, migrating pain to right lower quadrant, bloody bowel movements or vomit, any other concerning symptoms.    Discharge Instructions  Gastroenteritis    You have been seen today for vomiting (throwing up) and diarrhea (loose stools), called gastroenteritis or the stomach flu. This is usually caused by a virus, but some bacteria, parasites, medicines or other medical conditions can cause similar symptoms. At this time your provider does not find that your vomiting and diarrhea is a sign of anything dangerous or life-threatening.  However, sometimes the signs of serious illness do not show up right away. Remember that serious problems like appendicitis can look like gastroenteritis at first.       Generally, every Emergency Department visit should have a follow-up clinic visit with either a primary  or a specialty clinic/provider. Please follow-up as instructed by your emergency provider today.    Return to the Emergency Department if:  You keep vomiting and you are not able to keep liquids down.  You feel you are getting dehydrated, such as being very thirsty, not urinating (peeing), or feeling faint or lightheaded.   You develop a new fever.  You have abdominal (belly) pain that seems worse than cramps, is in one spot, or is getting worse over time.   You have blood in your vomit or in your diarrhea.  You feel very weak.    What can I do to help myself?  The most important thing to do is to drink clear liquids.  If you have been vomiting a lot, it is best to have only small, frequent sips of liquids.  Drinking too much at once may cause more vomiting. Water is a good first option for rehydration. If you are vomiting often, you must also replace electrolytes (salts and minerals) lost with your illness. Pedialyte  is the best rehydration liquid but many don?t like the taste so sports drinks (like Gatorade ) are a good option. Sodas and juice are also options but are high in sugar. Avoid acid liquids (orange), caffeine (coffee) or alcohol. Do not drink milk until you no longer have diarrhea.  After liquids are staying down, you may start eating mild foods. Soda crackers, toast, plain noodles, gelatin, applesauce and bananas are good first choices.  Avoid foods that have acid, are spicy, fatty or fibrous (such as meats, coarse grains, vegetables). You may start eating these foods again in about 3 days when you are better.  Sometimes treatment includes prescription medicine to prevent nausea (sick to your stomach) and vomiting and to prevent diarrhea. If your provider prescribes these for you, take them as directed.  Nonprescription medicine is available for the treatment of diarrhea and can be very effective.  If you use it, make sure you use the dose recommended on the package. Avoid Lomotil . Check with your  healthcare provider before you use any medicine for diarrhea.  Do not take ibuprofen, or other nonsteroidal anti-inflammatory medicines without checking with your healthcare provider.  If you were given a prescription for medicine here today, be sure to read all of the information (including the package insert) that comes with your prescription.  This will include important information about the medicine, its side effects, and any warnings that you need to know about.  The pharmacist who fills the prescription can provide more information and answer questions you may have about the medicine.  If you have questions or concerns that the pharmacist cannot address, please call or return to the Emergency Department.   Remember that you can always come back to the Emergency Department if you are not able to see your regular provider in the amount of time listed above, if you get any new symptoms, or if there is anything that worries you.

## 2019-05-13 NOTE — ED PROVIDER NOTES
"  History     Chief Complaint:    Nausea, Vomiting, & Diarrhea (Intermittent for past month with abdominal pain. Blood sugars running in the 200's per pt.)       TERRI Hernandez is a 19 year old female who presents to the ED via self/mother w/ a cc of nausea, vomiting, diarrhea onset this morning.  Patient reports that she has had diarrhea for the last 2 to 3 months, nonbloody.  She reports her symptoms have improved over the last 1 week with only having approximately 3 loose bowel movements per day but then worsened again this morning.  She reports diffuse, moderate, crampy, nonradiating abdominal pain which is somewhat alleviated after she vomits or has a bowel movement.  She denies chest pain, shortness of breath, fevers, chills, changes in urination.  She reports minimal vaginal discharge stating that she is likely about to menstruate.  She denies any other vaginal discharge.    Allergies:  Amoxicillin  Zithromax [Azithromycin]      Allergies   Allergen Reactions     Amoxicillin      Zithromax [Azithromycin]        Medications:      dicyclomine (BENTYL) 20 MG tablet   metoclopramide (REGLAN) 10 MG tablet   ondansetron (ZOFRAN ODT) 4 MG ODT tab   traMADol (ULTRAM) 50 MG tablet   acetone, Urine, test (KETOSTIX) STRP   blood glucose monitoring (ARLINE CONTOUR NEXT) test strip   blood glucose monitoring (ONETOUCH ULTRA) test strip   Continuous Blood Gluc  (DEXCOM  KIT) RUSTAM   Continuous Blood Gluc Sensor (DEXCOM G5 MOB/G4 PLAT SENSOR) MISC   Continuous Blood Gluc Transmit (DEXCOM G5 MOBILE TRANSMITTER) MISC   ferrous sulfate (IRON) 325 (65 FE) MG tablet   fluconazole (DIFLUCAN) 150 MG tablet   fluconazole (DIFLUCAN) 150 MG tablet   glucagon (GLUCAGON EMERGENCY) 1 MG kit   insulin lispro (HUMALOG) 100 UNIT/ML injection   insulin pen needle (BD PEN NEEDLE OPAL U/F) 32G X 4 MM MISC   insulin syringe-needle U-100 (BD INSULIN SYRINGE ULTRAFINE) 31G X 5/16\" 0.3 ML   MINIMED INSULIN INFUSION PUMP " "  norgestimate-ethinyl estradiol (ORTHO-CYCLEN, SPRINTEC) 0.25-35 MG-MCG per tablet       Past Medical History:    Past Medical History:   Diagnosis Date     Chronic generalized abdominal pain      Type I (juvenile type) diabetes mellitus without mention of complication, uncontrolled 2009       Patient Active Problem List    Diagnosis Date Noted     Uncontrolled type 1 diabetes mellitus with hyperglycemia (H) 01/12/2012     Priority: Medium     Problem list name updated by automated process. Provider to review          Past Surgical History:    Past Surgical History:   Procedure Laterality Date     ADENOIDECTOMY       APPENDECTOMY  2009     SINUS SURGERY          Family History:    family history includes Diabetes in an other family member; Thyroid Disease in her paternal grandmother.    Social History:   reports that she has never smoked. She has never used smokeless tobacco. She reports that she drinks alcohol. She reports that she does not use drugs.    PCP: Freddie Iglesias     Review of Systems  Full ROS completed and negative other than pertinent positives and negatives noted in HPI      Physical Exam     Patient Vitals for the past 24 hrs:   BP Temp Temp src Pulse Resp SpO2 Height   05/12/19 1849 135/81 98.3  F (36.8  C) Oral 99 18 99 % 1.702 m (5' 7\")        Physical Exam    Constitutional: Well developed, nontox appearance  Head: Atraumatic.   Mouth/Throat: Oropharynx is clear and moist.   Neck:  no stridor  Eyes: no scleral icterus  Cardiovascular: RRR, 2+ bilat radial pulses  Pulmonary/Chest: nml resp effort, Clear BS bilat  Abdominal: ND, +BS, soft, diffuse tenderness, no rebound or guarding   Ext: Warm, well perfused, no edema  Neurological: A&O, symmetric facies, moves ext x4  Skin: Skin is warm and dry.   Psychiatric: Behavior is normal. Thought content normal.   Nursing note and vitals reviewed.            Emergency Department Course       Laboratory:    Labs Ordered and Resulted from Time of ED " Arrival Up to the Time of Departure from the ED   CBC WITH PLATELETS DIFFERENTIAL - Abnormal; Notable for the following components:       Result Value    RBC Count 5.68 (*)     Hemoglobin 16.5 (*)     Hematocrit 47.1 (*)     Absolute Neutrophil 9.0 (*)     Absolute Lymphocytes 0.6 (*)     All other components within normal limits   COMPREHENSIVE METABOLIC PANEL - Abnormal; Notable for the following components:    Glucose 170 (*)     Bilirubin Total 1.4 (*)     All other components within normal limits   LIPASE - Abnormal; Notable for the following components:    Lipase 38 (*)     All other components within normal limits   ROUTINE UA WITH MICROSCOPIC - Abnormal; Notable for the following components:    Glucose Urine 70 (*)     Ketones Urine 40 (*)     Protein Albumin Urine 30 (*)     Leukocyte Esterase Urine Small (*)     Squamous Epithelial /HPF Urine 4 (*)     Mucous Urine Present (*)     All other components within normal limits   GLUCOSE BY METER - Abnormal; Notable for the following components:    Glucose 155 (*)     All other components within normal limits   BLOOD GAS VENOUS   KETONE BETA-HYDROXYBUTYRATE QUANTITATIVE   FECAL LACTOFERRIN   ISTAT HCG QUANTITATIVE PREGNANCY NURSING POCT   ISTAT HCG QUANTITATIVE PREGNANCY POCT   CLOSTRIDIUM DIFFICILE TOXIN B   ENTERIC BACTERIA AND VIRUS PANEL BY RIMA STOOL        Procedures:  Procedures        Interventions:    Medications   0.9% sodium chloride BOLUS (0 mLs Intravenous Stopped 5/12/19 2049)     Followed by   sodium chloride 0.9% infusion (1,000 mLs Intravenous New Bag 5/12/19 2121)   ondansetron (ZOFRAN) injection 4 mg (4 mg Intravenous Given 5/12/19 2053)   0.9% sodium chloride BOLUS (has no administration in time range)   dicyclomine (BENTYL) tablet 20 mg (20 mg Oral Given 5/12/19 2123)        Emergency Department Course:  Past medical records, nursing notes, and vitals reviewed.  I performed an exam of the patient and obtained history, as documented  above.    I rechecked the patient. Findings and plan explained to the Patient and family. Patient was agreeable to plan.    Impression & Plan        Medical Decision Makin year old female presenting w/ n/v/d, abdominal pain     DDx includes gastroenteritis, colitis, enteritis, electrolyte abnormality, DKA, hyperglycemia, inflammatory bowel disease, IBS, gastroparesis.  Doubt atypical ACS, appendicitis, surgical etiology given symptomatology, physical exam.  Labs  ordered as noted above.  Labs significant for mild hyperglycemia, normal pH, positive fecal lactoferrin.  Imaging deferred at this time given nonfocal physical exam.  I had a discussion with the patient and her mother regarding CT imaging which I do not think is warranted at this time particularly when the risk of unwarranted to radiation.  Medications given as noted above with intermittent improvmeent in symptoms. Pt likely experience gastroenteritis vs acute on chronic colitis.  C diff pending.  At this time I feel the pt is safe for discharge.  Rx written as noted below. Recommendations given regarding follow up with PCP or GI and return to the emergency department as needed for new or worsening symptoms.  Counseled on all results, disposition and diagnosis.  Pt understanding and agreeable to plan. Patient discharged in stable condition.          Diagnosis:    ICD-10-CM    1. Nausea vomiting and diarrhea R11.2     R19.7         Discharge Medications:  New Prescriptions    DICYCLOMINE (BENTYL) 20 MG TABLET    Take 1 tablet (20 mg) by mouth every 6 hours    METOCLOPRAMIDE (REGLAN) 10 MG TABLET    Take 1 tablet (10 mg) by mouth 3 times daily as needed (Nausea or Vomiting)    ONDANSETRON (ZOFRAN ODT) 4 MG ODT TAB    Take 1 tablet (4 mg) by mouth every 6 hours as needed    TRAMADOL (ULTRAM) 50 MG TABLET    Take 1-2 tablets ( mg) by mouth every 6 hours as needed for severe pain        2019   Levar Boyd MD Vaughn, Christopher  MD MARY LOU  05/13/19 1400

## 2019-06-10 ENCOUNTER — TELEPHONE (OUTPATIENT)
Dept: ENDOCRINOLOGY | Facility: CLINIC | Age: 19
End: 2019-06-10

## 2019-06-10 NOTE — TELEPHONE ENCOUNTER
[facility] Call Center    Phone Message    May a detailed message be left on voicemail: yes    Reason for Call: Pt's mother requesting letter for [place] Department of Public Safety regarding statement of opinion of Pt's health and safety for driving. Please call Pt's mom Mary back to discuss at [phone]. Letter is needed by this Friday, June 14.     Action Taken: Message routed to:  [facility] Endo Clinic

## 2019-06-11 NOTE — TELEPHONE ENCOUNTER
Spoke with Marion umanzor mother the issue is all cleared up and a letter is no longer needed for the DMV it was an error on there end.

## 2019-06-25 ENCOUNTER — MEDICAL CORRESPONDENCE (OUTPATIENT)
Dept: HEALTH INFORMATION MANAGEMENT | Facility: CLINIC | Age: 19
End: 2019-06-25

## 2019-06-26 ENCOUNTER — TELEPHONE (OUTPATIENT)
Dept: ENDOCRINOLOGY | Facility: CLINIC | Age: 19
End: 2019-06-26

## 2019-06-26 NOTE — TELEPHONE ENCOUNTER
Forms received from: [employer]     Forms were for pump supplies and diabetic testing supplies     Faxed Date:6/27/19

## 2019-09-27 DIAGNOSIS — E10.65 TYPE 1 DIABETES MELLITUS WITH HYPERGLYCEMIA (H): ICD-10-CM

## 2019-09-27 NOTE — TELEPHONE ENCOUNTER
insulin lispro (HUMALOG) 100 UNIT/ML injection        Last Written Prescription Date:  08/30/18  Last Fill Quantity: 90mL,   # refills: 3  Last Office Visit : 03/27/19  Future Office visit:  None scheduled    Routing refill request to provider for review/approval because:  Insulin - refilled per clinic

## 2019-09-27 NOTE — TELEPHONE ENCOUNTER
Short Acting Insulin Protocol Failed9/27 10:18 AM   LDL on file in past 12 months    HgbA1C in past 3 or 6 months    Recent (6 mo) or future (30 days) visit within the authorizing provider's specialty     Type 1  Sent to clinic coordinators for scheduling.   Claudia Taylor RN on 9/27/2019 at 10:29 AM

## 2019-10-10 ENCOUNTER — TELEPHONE (OUTPATIENT)
Dept: ENDOCRINOLOGY | Facility: CLINIC | Age: 19
End: 2019-10-10

## 2019-10-10 NOTE — TELEPHONE ENCOUNTER
Attempted to call both numbers listed on chart to schedule f/u appt with Dr. Parker in light of recent medication request after unread My[facility] message notification. Listed home could not be completed, listed mobile went straight to voicemail and box was full.

## 2019-11-04 ENCOUNTER — HEALTH MAINTENANCE LETTER (OUTPATIENT)
Age: 19
End: 2019-11-04

## 2019-11-15 DIAGNOSIS — E10.65 TYPE 1 DIABETES MELLITUS WITH HYPERGLYCEMIA (H): ICD-10-CM

## 2019-11-15 NOTE — TELEPHONE ENCOUNTER
insulin lispro (HUMALOG) 100 UNIT/ML vial      Last Written Prescription Date:  9-30-19  Last Fill Quantity: 100 ml,   # refills: 0  Last Office Visit : 3-27-19  Future Office visit:  none    Routing refill request to provider for review/approval because:  Insulin - refilled per clinic

## 2019-11-15 NOTE — TELEPHONE ENCOUNTER
Short Acting Insulin Protocol Tcuqnc69/15 10:45 AM   Blood pressure less than 140/90 in past 6 months    LDL on file in past 12 months    HgbA1C in past 3 or 6 months    Recent (6 mo) or future (30 days) visit within the authorizing provider's specialty     Last clinic visit 03/27/2019.

## 2019-11-15 NOTE — TELEPHONE ENCOUNTER
Last Clinic Visit: 3/27/2019  [facility] Endocr[facility]ology  Scheduling has been notified to contact the pt for appointment.

## 2019-11-15 NOTE — TELEPHONE ENCOUNTER
Short Acting Insulin Protocol Nmngxr13/15 10:45 AM   Blood pressure less than 140/90 in past 6 months    LDL on file in past 12 months    HgbA1C in past 3 or 6 months    Recent (6 mo) or future (30 days) visit within the authorizing provider's specialty

## 2019-12-02 ENCOUNTER — DOCUMENTATION ONLY (OUTPATIENT)
Dept: ENDOCRINOLOGY | Facility: CLINIC | Age: 19
End: 2019-12-02

## 2019-12-02 NOTE — PROGRESS NOTES
Patient did not show up for scheduled diabetes follow up appointment.     No charge for this encounter.     Es Mills PA-C  Diabetes Management Service  Pager [phone]

## 2019-12-04 ENCOUNTER — NURSE TRIAGE (OUTPATIENT)
Dept: NURSING | Facility: CLINIC | Age: 19
End: 2019-12-04

## 2019-12-04 DIAGNOSIS — E10.65 TYPE 1 DIABETES MELLITUS WITH HYPERGLYCEMIA (H): Primary | ICD-10-CM

## 2019-12-04 NOTE — TELEPHONE ENCOUNTER
Mother (Mary) Called an hour ago about the Pt's insulin pump is broke.   Would like to know how much insulin to give the Pt until they get the pump fixed.     The Pt is off at college over 2 hours away.   Mom is traveling to the college to help the Pt out.    A New insulin pump will be coming over night for Pt care.   Mother would like a call back @  [phone].    Called the back line to have a nurse call the Pt back.      Nayely Bob RN  Central Triage Red Flags/Med Refills      Additional Information    Negative: Unconscious or difficult to awaken    Negative: Acting confused (e.g., disoriented, slurred speech)    Negative: Very weak (can't stand)    Negative: Sounds like a life-threatening emergency to the triager    Negative: Vomiting and signs of dehydration (e.g., very dry mouth, lightheaded, etc.)    Negative: Blood glucose > 240 mg/dl (13 mmol/l) and rapid breathing    Negative: Blood glucose > 500 mg/dl (27.5 mmol/l)    Negative: Blood glucose > 240 mg/dl (13 mmol/l) AND urine ketones moderate-large (or more than 1+)    Negative: Blood glucose > 240 mg/dl (13 mmol/l) and blood ketones > 1.5 mmol/l    Negative: Blood glucose > 240 mg/dl (13 mmol/l) AND vomiting AND unable to check for ketones (in blood or urine)    Negative: Vomiting lasting > 4 hours    Negative: Patient sounds very sick or weak to the triager    Negative: Fever > 100.5 F (38.1 C)    Caller has URGENT medication or insulin pump question and triager unable to answer question    Protocols used: DIABETES - HIGH BLOOD SUGAR-A-OH

## 2019-12-10 ENCOUNTER — TELEPHONE (OUTPATIENT)
Dept: ENDOCRINOLOGY | Facility: CLINIC | Age: 19
End: 2019-12-10

## 2019-12-10 NOTE — TELEPHONE ENCOUNTER
Forms received from: [employer]     Forms were CMN for replacement pump     Faxed Date:12/11/19

## 2019-12-10 NOTE — TELEPHONE ENCOUNTER
[facility] Call Center    Phone Message    May a detailed message be left on voicemail: yes    Reason for Call: Other: Mother says that Pt's pump stopped working. [employer] sent over pump rx 1 wk ago and again today. Mother says rx needs to be signed in order for [employer] to process and ship the pump.     Please call mother once resolved/update and Jung from [employer] at [phone] for questions.     Action Taken: ENDO

## 2019-12-11 ENCOUNTER — MEDICAL CORRESPONDENCE (OUTPATIENT)
Dept: HEALTH INFORMATION MANAGEMENT | Facility: CLINIC | Age: 19
End: 2019-12-11

## 2019-12-26 ENCOUNTER — OFFICE VISIT (OUTPATIENT)
Dept: ENDOCRINOLOGY | Facility: CLINIC | Age: 19
End: 2019-12-26
Payer: COMMERCIAL

## 2019-12-26 ENCOUNTER — TELEPHONE (OUTPATIENT)
Dept: ENDOCRINOLOGY | Facility: CLINIC | Age: 19
End: 2019-12-26

## 2019-12-26 VITALS
HEART RATE: 101 BPM | WEIGHT: 169.6 LBS | DIASTOLIC BLOOD PRESSURE: 75 MMHG | BODY MASS INDEX: 26.56 KG/M2 | SYSTOLIC BLOOD PRESSURE: 110 MMHG

## 2019-12-26 DIAGNOSIS — E10.65 TYPE 1 DIABETES MELLITUS WITH HYPERGLYCEMIA (H): Primary | ICD-10-CM

## 2019-12-26 DIAGNOSIS — E10.65 TYPE 1 DIABETES MELLITUS WITH HYPERGLYCEMIA (H): ICD-10-CM

## 2019-12-26 LAB
HBA1C MFR BLD: 8.9 % (ref 4.3–6)
TSH SERPL DL<=0.005 MIU/L-ACNC: 1.29 MU/L (ref 0.4–4)

## 2019-12-26 ASSESSMENT — PAIN SCALES - GENERAL: PAINLEVEL: NO PAIN (0)

## 2019-12-26 NOTE — PROGRESS NOTES
[facility]  Endocrinology  Monalisa Ryan PA-C  12/26/2019      Chief Complaint:   Diabetes    History of Present Illness:   Jerrell Hernandez is a 19 year old female who presents for follow up of type 1 diabetes.  She was diagnosed with type 1 diabetes at age 9 and has been on an insulin pump since age 10.  In the past couple of years, her A1c has been more elevated as she had not been as diligent with her diabetic control while in college.  She started using the Dexcom sensor in 2018 but did not continue with it for more than a few weeks.  At her last appointment in March 2019, she was interested in restarting the Dexcom sensor.    December 2019:  Hemoglobin A1c is 8.9%.  She reports that her pump broke just before finals and she went back to injecting insulin for a few days.  She has now had her Medtronic back for about a week and a half.  She has just ordered and is awaiting delivery of the [employer] 670G pump  Guardian 3 sensor and is looking forward to using a hybrid closed loop system which she feels will be helpful for her.  Her plan is to make an appointment with diabetes education to help learn the system.  As far as her blood sugars, she had been doing better until the last few weeks with the increased stress of finals.  Ideally she would like to check her blood sugars 3 - 4 times a day, usually before meals.  When she is busy or stressed, she does check less.  At times she also does not want to check as she knows she will be high which at times feel overwhleming.  When she gives herself a correction bolus, it does bring her blood sugar down.  She has not had any lows after bolusing.  She has glucagon in case of emergency although has never needed to use it.  Her roommates are aware she has type 1 diabetes and will help bring her something if she starts to go low.  She does not drink alcohol often but does take precautions when she does.  She is generally active and also participates in a dance club a  couple times a week.    For about the past month she has been having abdominal pain after eating, especially certain foods like dairy.  She has been tested for celiac disease in the past which was negative.  She denies bloating or pain specifically after eating and wheat products.    She reports a history of iron deficiency for which she had iron infusions through her primary care clinic in 2017.    Her mother was just diagnosed with Hashimoto's thyroiditis.    Blood Glucose Monitoring:  We reviewed glucometer data together.  6 readings (0.2 per day)  Average blood glucose 279 mg/dL +/- 83  100% of readings above goal  Average daily carbs 63 +/- 51  6.1 g carb per unit boluses  Average total daily insulin 42.9 units +/- 4.9  93% basal, 7% bolus    Current Insulin Pump Settings:  Type of Pump: [employer] Minimed: Model Paradigm Revel 723    BASAL RATES and times:  12   AM (midnight): 1.5 units/hour    12  PM (noon): 1.8 units/hour   10   PM: 1.5 units/hour      Carb ratio:   CARB RATIO and times:  12   AM (midnight): 8  6     AM:  5.5  11   PM:  6.5    Correction Factor (Sensitivity) and times:  12   AM (midnight): 50 mg/dL  6     AM: 40 mg/dL  9   PM: 50 mg/dL    Target BG Ranges:   BLOOD GLUCOSE TARGET and times:  12   AM (midnight): 90 - 140  6     AM:  80 - 130  10   PM:  90 - 140    Amount of Time Insulin is Active:  3 hrs    We have just 6 days of pump data.  She used bolus wizard 1-3 times daily; 8x with food and 3 times for correction. In no instance was both BG and carbohydrates entered togteher.      6 BG entered in the last month, Avg 278, Range 195 - 372.    Diabetes monitoring and complications:  CAD: No  Last eye exam results: 11/24/19, no diabetic retinopathy   Microalbuminuria: negative 3/27/19  Neuropathy: No  HTN: No  On Statin: No  On Aspirin: No  Depression: No    Review of Systems:   Pertinent items are noted in HPI.  All other systems are negative.    Active Medications:      glucagon (GLUCAGON  EMERGENCY) 1 MG kit, Inject 1 mg intramuscular for unconscious hypoglycemia only. Please schedule clinic visit for Refills, Disp: 2 each, Rfl: 1     insulin glargine (LANTUS SOLOSTAR) 100 UNIT/ML pen, Inject 36 units once daily with pump failure., Disp: 15 mL, Rfl: 1     insulin lispro (HUMALOG) 100 UNIT/ML vial, Using up to 100 units daily. Please be seen in clinic  for future refills., Disp: 100 mL, Rfl: 0     norgestimate-ethinyl estradiol (ORTHO-CYCLEN, SPRINTEC) 0.25-35 MG-MCG per tablet, Take 1 tablet by mouth daily, Disp: 84 tablet, Rfl: 3     dicyclomine (BENTYL) 20 MG tablet, Take 1 tablet (20 mg) by mouth every 6 hours (Patient not taking: Reported on 12/26/2019), Disp: 30 tablet, Rfl: 0     ferrous sulfate (IRON) 325 (65 FE) MG tablet, Take 1 tablet (325 mg) by mouth daily (with breakfast) (Patient not taking: Reported on 8/29/2018), Disp: 30 tablet, Rfl: 3     fluconazole (DIFLUCAN) 150 MG tablet, 1 tablet by mouth every other day for total of 3 doses. (Patient not taking: Reported on 8/29/2018), Disp: 3 tablet, Rfl: 0     fluconazole (DIFLUCAN) 150 MG tablet, Take 1 tablet (150 mg) by mouth every 3 days (Patient not taking: Reported on 8/29/2018), Disp: 4 tablet, Rfl: 0     glucagon (GLUCAGON EMERGENCY) 1 MG injection, Inject 1 mg into the muscle once for 1 dose. For unconscious hypoglycemia only - dispense 2 kits (1 home and 1 school), Disp: 2 each, Rfl: 0     metoclopramide (REGLAN) 10 MG tablet, Take 1 tablet (10 mg) by mouth 3 times daily as needed (Nausea or Vomiting) (Patient not taking: Reported on 12/26/2019), Disp: 20 tablet, Rfl: 0     ondansetron (ZOFRAN ODT) 4 MG ODT tab, Take 1 tablet (4 mg) by mouth every 6 hours as needed (Patient not taking: Reported on 12/26/2019), Disp: 20 tablet, Rfl: 0      Allergies:   Amoxicillin and Zithromax [azithromycin]      Past Medical History:  Type 1 diabetes mellitus   Chronic constipation     Past Surgical History:  Sinus  "surgery  Adenoidectomy  Appendectomy     Family History:   Diabetes - cousin  Hypothyroidism - paternal grandmother      Social History:  Presents to clinic alone.  Student at [employer], studying psychology  Tobacco Use: No previous or current tobacco use.   Alcohol Use: Occasional alcohol use.   PCP: Freddie Iglesias      Physical Exam:   /75   Pulse 101   Wt 76.9 kg (169 lb 9.6 oz)   BMI 26.56 kg/m       Wt Readings from Last 10 Encounters:   12/26/19 76.9 kg (169 lb 9.6 oz)   03/27/19 78 kg (171 lb 14.4 oz)   08/29/18 75.7 kg (166 lb 12.8 oz) (91 %)*   03/30/18 82.2 kg (181 lb 3.2 oz) (95 %)*   12/15/16 78 kg (171 lb 15.3 oz) (94 %)*   08/04/16 73.6 kg (162 lb 4.1 oz) (91 %)*   05/19/16 75.8 kg (167 lb 1.7 oz) (93 %)*   02/18/16 77.4 kg (170 lb 10.2 oz) (94 %)*   09/17/15 77.2 kg (170 lb 3.1 oz) (94 %)*   07/30/15 75.2 kg (165 lb 12.6 oz) (93 %)*     * Growth percentiles are based on CDC (Girls, 2-20 Years) data.      General: Pleasant, well nourished and hydrated female in NAD.   Psych:  Mood is \"good,\" affect is appropriate.  Thought form and content are fluid and coherent.    HEENT: Eyes and sclera are clear. Extraocular movements are grossly intact without proptosis.  Nares are patent, mucous membranes moist.  Neck: No masses or JVD are noted.    Resp: Easy and unlabored breathing.   Neuro: Alert and oriented, communicating clearly.   Ext: no swelling or edema      Data:  Lab Results   Component Value Date     05/12/2019    POTASSIUM 3.6 05/12/2019    CHLORIDE 109 05/12/2019    CO2 27 05/12/2019    ANIONGAP 7 05/12/2019     (H) 05/12/2019    BUN 14 05/12/2019    CR 0.68 05/12/2019    KIANA 8.9 05/12/2019     Lab Results   Component Value Date    GFRESTIMATED >90 05/12/2019    GFRESTIMATED >90 03/30/2018    GFRESTIMATED >90  Non  GFR Calc   07/30/2015    GFRESTBLACK >90 05/12/2019    GFRESTBLACK >90 03/30/2018    GFRESTBLACK >90   GFR Calc   07/30/2015    "   Lab Results   Component Value Date    MICROL 8 03/27/2019    UMALCR 5.61 03/27/2019        Lab Results   Component Value Date    A1C 10.0 12/15/2016    A1C 10 08/04/2016    A1C 8.9 (A) 05/19/2016    A1C 10.2 (A) 02/18/2016    A1C 8.8 (A) 09/17/2015    HEMOGLOBINA1 8.9 (A) 12/26/2019    HEMOGLOBINA1 9.6 (A) 08/29/2018     Lab Results   Component Value Date    CHOL 132 03/30/2018    CHOL 154 12/04/2014    TRIG 70 03/30/2018    TRIG 217 (H) 12/04/2014    HDL 39 (L) 03/30/2018    HDL 33 (L) 12/04/2014    LDL 79 03/30/2018    LDL 78 12/04/2014    NHDL 93 03/30/2018       Assessment and Plan:  Type 1 diabetes mellitus with hyperglycemia (H)  Longstanding type 1 diabetic, not currently at goal.  She will schedule with diabetes education to learn the 670G system when she gets the sensor.  Encouraged her to check her blood sugar 4 times a day and do correction boluses as needed.  Referral to health psychology to discuss stress of managing chronic disease and how to develop consistent habits of care for her diabetes.  Will rescreen her for celiac disease given her abdominal pain and also check thyroid function given her mother's recent Hashimoto's diagnosis.  - Hemoglobin A1c POCT  - Tissue transglutaminase emelyn IgA and IgG  - TSH with free T4 reflex  - MENTAL HEALTH REFERRAL  - Adult; Outpatient Treatment; Individual/Couples/Family/Group Therapy/Health Psychology; [facility]: Health Psychology - Sherita Reddy [phone]; Patient call to schedule     Follow-up: Return in about 3 months (around 3/26/2020).     >50% of 35 minute visit spent in face to face counseling, education and coordination of care related to options for better glycemic control as well as preventing, detecting, and treating hypoglycemia.      It is my privilege to be involved in the care of the above patient.     Monalisa Ryan PA-C, MPAS  [facility]  Diabetes, Endocrinology, and Metabolism  [phone] Appointments/Nurse  [phone]  Fax  [phone] pager  [phone] nurse line               Scribe Disclosure:  I, Dolores Key, am serving as a scribe to document services personally performed by Monalisa Ryan PA-C at this visit, based upon the provider's statements to me. All documentation has been reviewed by the aforementioned provider prior to being entered into the official medical record.     Portions of this medical record were completed by a scribe. UPON MY REVIEW AND AUTHENTICATION BY ELECTRONIC SIGNATURE, this confirms (a) I performed the applicable clinical services, and (b) the record is accurate.

## 2019-12-26 NOTE — TELEPHONE ENCOUNTER
[facility] Call Center    Phone Message    May a detailed message be left on voicemail: yes    Reason for Call: Other: . Pt's mother called (Jessica) wanting the progress/clinical notes from today visit with Monalisa Ryan to be fax to [employer] so patient can get glucose monitor before the end of the year. [employer] fax number      Action Taken: Message routed to:  [facility] endo

## 2019-12-26 NOTE — PATIENT INSTRUCTIONS
"It is good to meet you!    You can do this!    Please check BG before each meal and correct 4 times daily.     Please see your primary care provider about your stomach pain and to follow-up labs.    I recommend that you schedule with health psychology.    Please let us know of any other way we can help!  Call with question about 670 G system.    Keep exercising and a heart healthy diet!    My best wishes,    Monalisa Ryan PA-C, MPAS  [facility]  Diabetes, Endocrinology, and Metabolism  [phone] Appointments/Nurse  [phone] Fax  [phone] nurse line  [phone] URGENTafter hours/weekend Endocrinologist on call      Medtronic 670G Auto mode tips:   -Calibrate before breakfast, lunch, dinner and bedtime.   -Take your bolus 15 to 20 minutes before eating   - When the pump suggests a correction bolus, accept the bolus, unless planning some intense physical activity in the next hour or so.   -If there is a request for a BG, snooze it until it has been at least 30 minutes since the last entry.   -Consider wearing sensor on your arm.   -Respond to all alerts and alarms promptly  -Treat low sugars with no more than 15g carbohydrate (4 tabs); in most cases use about a third of the amount you would normally treat with (5 to 10 grams, rather than 15-30 grams)  and recheck actual BG to determine response to treatment, rather than looking at the CGM screen, which will be quite slow to catch up and may lead one to over-treat the low.      -Use 150 temp target with activity to prevent low blood sugars; for most this means increasing to 150 target 1 hour before exercise when able.      When in MANUAL MODE:  -Be cautious, as you may need to reduce your bolus (especially for carbohydrates) in manual mode.   -Enable \"suspend before low or suspend on low\" in the smartguard low set up if you are out of auto mode for a prolonged period.                                  "

## 2019-12-26 NOTE — LETTER
12/26/2019       RE: Jerrell Hernandez  [address] [place] [address]     Dear Colleague,    Thank you for referring your patient, Jerrell Hernandez, to the [facility] ENDOCRINOLOGY at [facility]. Please see a copy of my visit note below.    [facility]  Endocrinology  Monalisa Ryan PA-C  12/26/2019      Chief Complaint:   Diabetes    History of Present Illness:   Jerrell Hernandez is a 19 year old female who presents for follow up of type 1 diabetes.  She was diagnosed with type 1 diabetes at age 9 and has been on an insulin pump since age 10.  In the past couple of years, her A1c has been more elevated as she had not been as diligent with her diabetic control while in college.  She started using the Dexcom sensor in 2018 but did not continue with it for more than a few weeks.  At her last appointment in March 2019, she was interested in restarting the Dexcom sensor.    December 2019:  Hemoglobin A1c is 8.9%.  She reports that her pump broke just before finals and she went back to injecting insulin for a few days.  She has now had her Medtronic back for about a week and a half.  She has just ordered and is awaiting delivery of the Medtronic 670G pump  Guardian 3 sensor and is looking forward to using a hybrid closed loop system which she feels will be helpful for her.  Her plan is to make an appointment with diabetes education to help learn the system.  As far as her blood sugars, she had been doing better until the last few weeks with the increased stress of finals.  Ideally she would like to check her blood sugars 3 - 4 times a day, usually before meals.  When she is busy or stressed, she does check less.  At times she also does not want to check as she knows she will be high which at times feel overwhleming.  When she gives herself a correction bolus, it does bring her blood sugar down.  She has not had any lows after bolusing.  She has glucagon in case of  emergency although has never needed to use it.  Her roommates are aware she has type 1 diabetes and will help bring her something if she starts to go low.  She does not drink alcohol often but does take precautions when she does.  She is generally active and also participates in a dance club a couple times a week.    For about the past month she has been having abdominal pain after eating, especially certain foods like dairy.  She has been tested for celiac disease in the past which was negative.  She denies bloating or pain specifically after eating and wheat products.    She reports a history of iron deficiency for which she had iron infusions through her primary care clinic in 2017.    Her mother was just diagnosed with Hashimoto's thyroiditis.    Blood Glucose Monitoring:  We reviewed glucometer data together.  6 readings (0.2 per day)  Average blood glucose 279 mg/dL +/- 83  100% of readings above goal  Average daily carbs 63 +/- 51  6.1 g carb per unit boluses  Average total daily insulin 42.9 units +/- 4.9  93% basal, 7% bolus    Current Insulin Pump Settings:  Type of Pump: Medtronic Minimed: Model Paradigm Revel 723    BASAL RATES and times:  12   AM (midnight): 1.5 units/hour    12  PM (noon): 1.8 units/hour   10   PM: 1.5 units/hour      Carb ratio:   CARB RATIO and times:  12   AM (midnight): 8  6     AM:  5.5  11   PM:  6.5    Correction Factor (Sensitivity) and times:  12   AM (midnight): 50 mg/dL  6     AM: 40 mg/dL  9   PM: 50 mg/dL    Target BG Ranges:   BLOOD GLUCOSE TARGET and times:  12   AM (midnight): 90 - 140  6     AM:  80 - 130  10   PM:  90 - 140    Amount of Time Insulin is Active:  3 hrs    We have just 6 days of pump data.  She used bolus wizard 1-3 times daily; 8x with food and 3 times for correction. In no instance was both BG and carbohydrates entered togteher.      6 BG entered in the last month, Avg 278, Range 195 - 372.    Diabetes monitoring and complications:  CAD: No  Last eye  exam results: 11/24/19, no diabetic retinopathy   Microalbuminuria: negative 3/27/19  Neuropathy: No  HTN: No  On Statin: No  On Aspirin: No  Depression: No    Review of Systems:   Pertinent items are noted in HPI.  All other systems are negative.    Active Medications:      glucagon (GLUCAGON EMERGENCY) 1 MG kit, Inject 1 mg intramuscular for unconscious hypoglycemia only. Please schedule clinic visit for Refills, Disp: 2 each, Rfl: 1     insulin glargine (LANTUS SOLOSTAR) 100 UNIT/ML pen, Inject 36 units once daily with pump failure., Disp: 15 mL, Rfl: 1     insulin lispro (HUMALOG) 100 UNIT/ML vial, Using up to 100 units daily. Please be seen in clinic  for future refills., Disp: 100 mL, Rfl: 0     norgestimate-ethinyl estradiol (ORTHO-CYCLEN, SPRINTEC) 0.25-35 MG-MCG per tablet, Take 1 tablet by mouth daily, Disp: 84 tablet, Rfl: 3     dicyclomine (BENTYL) 20 MG tablet, Take 1 tablet (20 mg) by mouth every 6 hours (Patient not taking: Reported on 12/26/2019), Disp: 30 tablet, Rfl: 0     ferrous sulfate (IRON) 325 (65 FE) MG tablet, Take 1 tablet (325 mg) by mouth daily (with breakfast) (Patient not taking: Reported on 8/29/2018), Disp: 30 tablet, Rfl: 3     fluconazole (DIFLUCAN) 150 MG tablet, 1 tablet by mouth every other day for total of 3 doses. (Patient not taking: Reported on 8/29/2018), Disp: 3 tablet, Rfl: 0     fluconazole (DIFLUCAN) 150 MG tablet, Take 1 tablet (150 mg) by mouth every 3 days (Patient not taking: Reported on 8/29/2018), Disp: 4 tablet, Rfl: 0     glucagon (GLUCAGON EMERGENCY) 1 MG injection, Inject 1 mg into the muscle once for 1 dose. For unconscious hypoglycemia only - dispense 2 kits (1 home and 1 school), Disp: 2 each, Rfl: 0     metoclopramide (REGLAN) 10 MG tablet, Take 1 tablet (10 mg) by mouth 3 times daily as needed (Nausea or Vomiting) (Patient not taking: Reported on 12/26/2019), Disp: 20 tablet, Rfl: 0     ondansetron (ZOFRAN ODT) 4 MG ODT tab, Take 1 tablet (4  "mg) by mouth every 6 hours as needed (Patient not taking: Reported on 12/26/2019), Disp: 20 tablet, Rfl: 0      Allergies:   Amoxicillin and Zithromax [azithromycin]      Past Medical History:  Type 1 diabetes mellitus   Chronic constipation     Past Surgical History:  Sinus surgery  Adenoidectomy  Appendectomy     Family History:   Diabetes - cousin  Hypothyroidism - paternal grandmother      Social History:  Presents to clinic alone.  Student at [employer], studying psychology  Tobacco Use: No previous or current tobacco use.   Alcohol Use: Occasional alcohol use.   PCP: Freddie Iglesias      Physical Exam:   /75   Pulse 101   Wt 76.9 kg (169 lb 9.6 oz)   BMI 26.56 kg/m        Wt Readings from Last 10 Encounters:   12/26/19 76.9 kg (169 lb 9.6 oz)   03/27/19 78 kg (171 lb 14.4 oz)   08/29/18 75.7 kg (166 lb 12.8 oz) (91 %)*   03/30/18 82.2 kg (181 lb 3.2 oz) (95 %)*   12/15/16 78 kg (171 lb 15.3 oz) (94 %)*   08/04/16 73.6 kg (162 lb 4.1 oz) (91 %)*   05/19/16 75.8 kg (167 lb 1.7 oz) (93 %)*   02/18/16 77.4 kg (170 lb 10.2 oz) (94 %)*   09/17/15 77.2 kg (170 lb 3.1 oz) (94 %)*   07/30/15 75.2 kg (165 lb 12.6 oz) (93 %)*     * Growth percentiles are based on CDC (Girls, 2-20 Years) data.      General: Pleasant, well nourished and hydrated female in NAD.   Psych:  Mood is \"good,\" affect is appropriate.  Thought form and content are fluid and coherent.    HEENT: Eyes and sclera are clear. Extraocular movements are grossly intact without proptosis.  Nares are patent, mucous membranes moist.  Neck: No masses or JVD are noted.    Resp: Easy and unlabored breathing.   Neuro: Alert and oriented, communicating clearly.   Ext: no swelling or edema      Data:  Lab Results   Component Value Date     05/12/2019    POTASSIUM 3.6 05/12/2019    CHLORIDE 109 05/12/2019    CO2 27 05/12/2019    ANIONGAP 7 05/12/2019     (H) 05/12/2019    BUN 14 05/12/2019    CR 0.68 05/12/2019    KIANA 8.9 05/12/2019     Lab " Results   Component Value Date    GFRESTIMATED >90 05/12/2019    GFRESTIMATED >90 03/30/2018    GFRESTIMATED >90  Non  GFR Calc   07/30/2015    GFRESTBLACK >90 05/12/2019    GFRESTBLACK >90 03/30/2018    GFRESTBLACK >90   GFR Calc   07/30/2015      Lab Results   Component Value Date    MICROL 8 03/27/2019    UMALCR 5.61 03/27/2019        Lab Results   Component Value Date    A1C 10.0 12/15/2016    A1C 10 08/04/2016    A1C 8.9 (A) 05/19/2016    A1C 10.2 (A) 02/18/2016    A1C 8.8 (A) 09/17/2015    HEMOGLOBINA1 8.9 (A) 12/26/2019    HEMOGLOBINA1 9.6 (A) 08/29/2018     Lab Results   Component Value Date    CHOL 132 03/30/2018    CHOL 154 12/04/2014    TRIG 70 03/30/2018    TRIG 217 (H) 12/04/2014    HDL 39 (L) 03/30/2018    HDL 33 (L) 12/04/2014    LDL 79 03/30/2018    LDL 78 12/04/2014    NHDL 93 03/30/2018       Assessment and Plan:  Type 1 diabetes mellitus with hyperglycemia (H)  Longstanding type 1 diabetic, not currently at goal.  She will schedule with diabetes education to learn the 670G system when she gets the sensor.  Encouraged her to check her blood sugar 4 times a day and do correction boluses as needed.  Referral to health psychology to discuss stress of managing chronic disease and how to develop consistent habits of care for her diabetes.  Will rescreen her for celiac disease given her abdominal pain and also check thyroid function given her mother's recent Hashimoto's diagnosis.  - Hemoglobin A1c POCT  - Tissue transglutaminase emelyn IgA and IgG  - TSH with free T4 reflex  - MENTAL HEALTH REFERRAL  - Adult; Outpatient Treatment; Individual/Couples/Family/Group Therapy/Health Psychology; [facility]: Health Psychology - Sherita Reddy [phone]; Patient call to schedule     Follow-up: Return in about 3 months (around 3/26/2020).     >50% of 35 minute visit spent in face to face counseling, education and coordination of care related to options for better glycemic control as well  as preventing, detecting, and treating hypoglycemia.      It is my privilege to be involved in the care of the above patient.     Monalisa Ryan PA-C, [facility]  [facility]  Diabetes, Endocrinology, and Metabolism  [phone] Appointments/Nurse  [phone] Fax  [phone] pager  [phone] nurse line      Scribe Disclosure:  I, Dolores Key, am serving as a scribe to document services personally performed by Monalisa Ryan PA-C at this visit, based upon the provider's statements to me. All documentation has been reviewed by the aforementioned provider prior to being entered into the official medical record.     Portions of this medical record were completed by a scribe. UPON MY REVIEW AND AUTHENTICATION BY ELECTRONIC SIGNATURE, this confirms (a) I performed the applicable clinical services, and (b) the record is accurate.

## 2019-12-26 NOTE — TELEPHONE ENCOUNTER
I have communicated with Medtr[employer] and once the visit is complete I will be faxing the office visit over.

## 2019-12-26 NOTE — TELEPHONE ENCOUNTER
[facility] Call Center    Phone Message    May a detailed message be left on voicemail: yes    Reason for Call: Other: Pt mother needs us to send message to insurance to confirm todays appt and request insurance allow shipping of test strips.  She is worries about beating the insurance deadline 1/31/19.     Action Taken: Message routed to:  [facility] endocrine

## 2019-12-29 DIAGNOSIS — E10.65 TYPE 1 DIABETES MELLITUS WITH HYPERGLYCEMIA (H): ICD-10-CM

## 2019-12-29 NOTE — TELEPHONE ENCOUNTER
insulin glargine (LANTUS SOLOSTAR) 100 UNIT/ML pen      Last Written Prescription Date:  12-4-19  Last Fill Quantity: 15,   # refills: 1  Last Office Visit : 12-26-19  Future Office visit:  4-8-2020    Routing refill request to provider for review/approval because:  Insulin - refilled per clinic        Kathleen M Doege RN

## 2019-12-30 LAB
TTG IGA SER-ACNC: 1 U/ML
TTG IGG SER-ACNC: <1 U/ML

## 2019-12-30 NOTE — TELEPHONE ENCOUNTER
Long Acting Insulin Protocol [id number] 10:25 AM   LDL on file in past 12 months     Type 1  rtc appt in place 04/08/2020.  Coverage in case of pump failure.   Claudia Taylor RN on 12/30/2019 at 10:06 AM

## 2020-03-16 DIAGNOSIS — E10.65 TYPE 1 DIABETES MELLITUS WITH HYPERGLYCEMIA (H): ICD-10-CM

## 2020-03-16 NOTE — TELEPHONE ENCOUNTER
[facility] Call Center    Phone Message    May a detailed message be left on voicemail: yes     Reason for Call: Medication Refill Request    Has the patient contacted the pharmacy for the refill? Yes   Name of medication being requested: insulin lispro (HUMALOG) 100 UNIT/ML vial   Provider who prescribed the medication: Sabiha Parker  Pharmacy: [facility] #[id number] - [place], [place] - [address] Phone: [phone]  Date medication is needed: 3/16/2020, Needing it asap today,       Action Taken: Message routed to:  [facility] Endo    Travel Screening: Not Applicable

## 2020-04-08 ENCOUNTER — TELEPHONE (OUTPATIENT)
Dept: ENDOCRINOLOGY | Facility: CLINIC | Age: 20
End: 2020-04-08

## 2020-04-08 NOTE — TELEPHONE ENCOUNTER
[facility] Call Center    Phone Message    May a detailed message be left on voicemail: yes     Reason for Call: Form or Letter   Type or form/letter needing completion: DMV Form  Provider: Keith  Date form needed: 4/12/20  Please follow up with pts mother for questions and concerns       Action Taken: Message routed to:  [facility] Endo    Travel Screening: Not Applicable

## 2020-04-09 ENCOUNTER — DOCUMENTATION ONLY (OUTPATIENT)
Dept: CARE COORDINATION | Facility: CLINIC | Age: 20
End: 2020-04-09

## 2020-04-10 NOTE — TELEPHONE ENCOUNTER
Left patients mother a detailed message this form was sent in error and they sent the correct form on 4/9/20.

## 2020-05-05 NOTE — PROGRESS NOTES
"CALLED PT 4/7@902AM COULDNT LEAVE  MAILBOX FULL-JM- CALLED PT 5/5@1:05PM  MAILBOX FULL-BRITTANIE    Jerrell Hernandez is a 20 year old female who is being evaluated via a billable video visit.      The patient has been notified of following:     \"This video visit will be conducted via a call between you and your physician/provider. We have found that certain health care needs can be provided without the need for an in-person physical exam.  This service lets us provide the care you need with a video conversation.  If a prescription is necessary we can send it directly to your pharmacy.  If lab work is needed we can place an order for that and you can then stop by our lab to have the test done at a later time.    Video visits are billed at different rates depending on your insurance coverage.  Please reach out to your insurance provider with any questions.    If during the course of the call the physician/provider feels a video visit is not appropriate, you will not be charged for this service.\"    Patient has given verbal consent for Video visit? Yes    How would you like to obtain your AVS? Mail a copy    Patient would like the video invitation sent by: Send to e-mail at: [email or web address]    Will anyone else be joining your video visit? No      Time of video visit start: 1:01 pm  Time of video visit end: 1:27 pm  Duration of visit: 26 minutes    Endocrinology Clinic Visit 5/6/2020  NAME:  Jerrell Hernandez  PCP:  Freddie Iglesias  MRN:  [id number]    Chief Complaint     Chief Complaint   Patient presents with     RECHECK     TYPE 1       History of Present Illness     Jerrell Hernandez is a 20 year old female who is seen in clinic for diabetes management.     She was diagnosed with type 1 diabetes at age 9. On insulin pump since age 10. Last couple of years she has had elevations in A1c related to being in college and not so diligent with diabetes control. A1c was down to 8's before college then went up. " Gained 15 lbs in college.  Started on the Dex com G5 in August 2018.      Complications:   No retinopathy - last eye exam fall 2018  No neuropathy  No known nephropathy. Last urine microalbumin neg >2 yrs ago    Interval History:   Last visit with Monalisa Connor Dec 2019.   Since Covid-19 started, she moved back to her parents. She is finishing her semester at college. Graduating soon. 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     For her diabetes care, she ordered the Medtronic 670 G system. She just received it and she will be starting it this week.   That is why she has not been using DEXCOM.     She has lost weight since being home (about 10 lbs). So she has been having severe hypoglycemias into the 40s. Mostly low in the mornings. Current weight is 160 lbs.   Has not had any high readings.   She has been bolusing for her meals.     Associated Signs/Symptoms  Hypoglycemia: no. Hyperglycemia: none.Neuropathy: none. Vascular Symtpoms: none. Angina/CHF: none. Ulcers: No. Amputations: No    Current treatment strategy: Insulin pump: Medtronic MiniMed at following settings:   BASAL RATES and times:   00:00 1.5 units/hour    08:00 1.5 units/hour    12:00 1.9 units/hour    22:00 1.5 units/hour   CARB RATIO and times:   00:00 8   06:00 5.5   10:00 5.5   21:00 6.5  Correction factor (Sensitivity and times):    00:00 50   06:00 40   21:00 50  BLOOD GLUCOSE TARGET and times:   00:00    06:00    22:00     Blood Glucose Monitoring: she reported general patterns for me, tending to be low    Diet: no schedule. Counts carbs.     Exercise: dances 2 times a week, works at .    Weight:   Wt Readings from Last 4 Encounters:   12/26/19 76.9 kg (169 lb 9.6 oz)   03/27/19 78 kg (171 lb 14.4 oz)   08/29/18 75.7 kg (166 lb 12.8 oz) (91 %)*   03/30/18 82.2 kg (181 lb 3.2 oz) (95 %)*     * Growth percentiles are based on CDC (Girls, 2-20 Years) data.     Problem List     Patient Active Problem List  "  Diagnosis     Uncontrolled type 1 diabetes mellitus with hyperglycemia (H)        Medications     Current Outpatient Medications   Medication     acetone urine (KETOSTIX) test strip     blood glucose monitoring (ARLINE CONTOUR NEXT) test strip     blood glucose monitoring (ONETOUCH ULTRA) test strip     Continuous Blood Gluc  (DEXCOM  KIT) RUSTAM     Continuous Blood Gluc Sensor (DEXCOM G5 MOB/G4 PLAT SENSOR) MISC     Continuous Blood Gluc Transmit (DEXCOM G5 MOBILE TRANSMITTER) MISC     dicyclomine (BENTYL) 20 MG tablet     ferrous sulfate (IRON) 325 (65 FE) MG tablet     fluconazole (DIFLUCAN) 150 MG tablet     fluconazole (DIFLUCAN) 150 MG tablet     glucagon (GLUCAGON EMERGENCY) 1 MG injection     glucagon (GLUCAGON EMERGENCY) 1 MG injection     glucagon (GLUCAGON EMERGENCY) 1 MG kit     insulin glargine (LANTUS SOLOSTAR) 100 UNIT/ML pen     insulin lispro (HUMALOG) 100 UNIT/ML vial     insulin pen needle (BD PEN NEEDLE OPAL U/F) 32G X 4 MM MISC     insulin syringe-needle U-100 (BD INSULIN SYRINGE ULTRAFINE) 31G X 5/16\" 0.3 ML     metoclopramide (REGLAN) 10 MG tablet     MINIMED INSULIN INFUSION PUMP     norgestimate-ethinyl estradiol (ORTHO-CYCLEN, SPRINTEC) 0.25-35 MG-MCG per tablet     ondansetron (ZOFRAN ODT) 4 MG ODT tab     No current facility-administered medications for this visit.         Allergies     Allergies   Allergen Reactions     Amoxicillin      Zithromax [Azithromycin]        Medical / Surgical History     Past Medical History:   Diagnosis Date     Chronic generalized abdominal pain     constipation     Type I (juvenile type) diabetes mellitus without mention of complication, uncontrolled 2009    previosuly followed at [facility]     Past Surgical History:   Procedure Laterality Date     ADENOIDECTOMY       APPENDECTOMY  2009     SINUS SURGERY         Social History     Social History     Socioeconomic History     Marital status: Single     Spouse name: Not on file     Number of " children: Not on file     Years of education: Not on file     Highest education level: Not on file   Occupational History     Not on file   Social Needs     Financial resource strain: Not on file     Food insecurity     Worry: Not on file     Inability: Not on file     Transportation needs     Medical: Not on file     Non-medical: Not on file   Tobacco Use     Smoking status: Never Smoker     Smokeless tobacco: Never Used   Substance and Sexual Activity     Alcohol use: Yes     Comment: occ.     Drug use: Never     Sexual activity: Not Currently   Lifestyle     Physical activity     Days per week: Not on file     Minutes per session: Not on file     Stress: Not on file   Relationships     Social connections     Talks on phone: Not on file     Gets together: Not on file     Attends Rastafarian service: Not on file     Active member of club or organization: Not on file     Attends meetings of clubs or organizations: Not on file     Relationship status: Not on file     Intimate partner violence     Fear of current or ex partner: Not on file     Emotionally abused: Not on file     Physically abused: Not on file     Forced sexual activity: Not on file   Other Topics Concern     Not on file   Social History Narrative    Grade 8th grade ([id number])    [employer]    Interested in [employer]       Family History     Family History   Problem Relation Age of Onset     Diabetes Other         2nd cousin     Thyroid Disease Paternal Grandmother         hypothyroidism     Gastrointestinal Disease No family hx of         celiac or inflammatory bowel disease       ROS     Constitutional: no fevers, chills, night sweats. No weight loss or fatigue. Good appetite  Eyes: no vision changes, no eye redness, no diplopia  Ears, Nose, mouth, throat: no hearing changes, no tinnitus, no rhinorrhea, no nasal congestion  Cardiovascular: no chest pain, no orthopnea or PND, no edema, no palpitations  Respiratory: no dyspnea, no cough, no sputum,  no wheezing  Gastrointestinal: no nausea, no vomiting, no abdominal pain, no diarrhea, no constipation  Genitourinary: no dysuria, no frequency, no urgency, no nocturia  Musculoskeletal: no joint pains, no back pain, no cramps, no fractures  Skin: no rash, no itching, no dryness, no ulcers, no hair loss  Neurological: no headache, no weakness, no numbness, no dizziness, no tremors  Psychiatric: no anxiety, no sadness  Hematologic/lymphatic: no easy bruising, no bleeding, no palor    Physical Exam   There were no vitals taken for this visit.   GENERAL: healthy, alert and no distress  EYES: Eyes grossly normal to inspection, conjunctivae and sclerae normal  RESP: no audible wheeze, cough, or visible cyanosis.  No visible retractions or increased work of breathing.  Able to speak fully in complete sentences.  NEURO: Cranial nerves grossly intact, mentation intact and speech normal  PSYCH: mentation appears normal, affect normal/bright, judgement and insight intact, normal speech and appearance well-groomed      Labs/Imaging and Outside Records     Pertinent Labs were reviewed and updated in EPIC.    Summary of recent findings:   Lab Results   Component Value Date    A1C 10.0 12/15/2016    A1C 10 08/04/2016    A1C 8.9 05/19/2016    A1C 10.2 02/18/2016    A1C 8.8 09/17/2015       TSH   Date Value Ref Range Status   12/26/2019 1.29 0.40 - 4.00 mU/L Final   03/30/2018 1.95 0.40 - 4.00 mU/L Final   12/15/2016 1.91 0.40 - 4.00 mU/L Final   08/04/2016 3.22 0.40 - 4.00 mU/L Final   07/30/2015 2.58 0.40 - 4.00 mU/L Final       Creatinine   Date Value Ref Range Status   05/12/2019 0.68 0.52 - 1.04 mg/dL Final       Recent Labs   Lab Test  12/04/14   1714  10/25/12   1420   CHOL  154  173   HDL  33*  38*   LDL  78  90   TRIG  217*  231*   CHOLHDLRATIO  4.7  4.6       Impression / Plan     1. Diabetes Mellitus: Type 1  Current glycemic control can be considered ok.   She is having hypoglycemia due to weight loss.   I will reduce  her basal rates by 15%.   She will be switching to the new Medtronic pump soon.   I advised her to get the right training, and advised her how to best use the new pump including auto mode. Compliance with bolusing will be critical in that pump system.     Adjusted settings today:   BASAL RATES and times:   00:00 1.3 units/hour    08:00 1.3 units/hour    12:00 1.7 units/hour    22:00 1.3 units/hour     2. Diabetes Complications: With none.     3. Blood Pressure Management: Blood pressure is controlled. Currently is not on pharmacotherapy for this.     4.Lipid Management: Per the new ACC/KUNAL/NHLBI guidelines, statins are recommended for individuals with diabetes aged 40-75 with LDL  without ASCVD, and for any individual with ASCVD. Currently the patient is not on a statin.     5. Smoking Status: Patient Pt is smoke free..       Follow up: 3 months with Monalisa Parker MD  Endocrinology, Diabetes and Metabolism  [facility]

## 2020-05-06 ENCOUNTER — VIRTUAL VISIT (OUTPATIENT)
Dept: ENDOCRINOLOGY | Facility: CLINIC | Age: 20
End: 2020-05-06
Payer: COMMERCIAL

## 2020-05-06 DIAGNOSIS — E10.9 TYPE 1 DIABETES MELLITUS WITHOUT COMPLICATION (H): ICD-10-CM

## 2020-05-06 DIAGNOSIS — E10.65 UNCONTROLLED TYPE 1 DIABETES MELLITUS WITH HYPERGLYCEMIA (H): Primary | ICD-10-CM

## 2020-05-06 NOTE — PATIENT INSTRUCTIONS
Set up return visit with Monalisa Ryan in 3 months.     Sabiha Parker MD  Endocrinology, Diabetes and Metabolism  [facility]

## 2020-09-17 ENCOUNTER — VIRTUAL VISIT (OUTPATIENT)
Dept: EDUCATION SERVICES | Facility: CLINIC | Age: 20
End: 2020-09-17
Payer: COMMERCIAL

## 2020-09-17 DIAGNOSIS — E10.65 UNCONTROLLED TYPE 1 DIABETES MELLITUS WITH HYPERGLYCEMIA (H): Primary | ICD-10-CM

## 2020-09-17 NOTE — PROGRESS NOTES
"Jerrell Hernandez is a 20 year old female who is being evaluated via a billable telephone visit.      The patient has been notified of following:     \"This telephone visit will be conducted via a call between you and your physician/provider. We have found that certain health care needs can be provided without the need for a physical exam.  This service lets us provide the care you need with a short phone conversation.  If a prescription is necessary we can send it directly to your pharmacy.  If lab work is needed we can place an order for that and you can then stop by our lab to have the test done at a later time.    Telephone visits are billed at different rates depending on your insurance coverage. During this emergency period, for some insurers they may be billed the same as an in-person visit.  Please reach out to your insurance provider with any questions.    If during the course of the call the physician/provider feels a telephone visit is not appropriate, you will not be charged for this service.\"    Patient has given verbal consent for Telephone visit?  Yes    How would you like to obtain your AVS? MyChart    Phone call duration: 25 minutes    Brigid Petit RN    Diabetes Self-Management Education & Support    DIABETES EDUCATION NOTE:    Jerrell Hernandez presents today for education related to Type 1 diabetes    Patient is being treated with:  insulin pump  She is accompanied by self.  Type 1 diabetes was diagnosed at age 9.    She has been using an insulin pump for several years.     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    She is in college currently, majoring in psychology. She is hoping to work in health psychology when she graduates.    She was diagnosed with Covid-19 this morning and has some questions about this. "     ASSESSMENT:  Current Diabetes Management per Patient:  Taking diabetes medications?   yes:     Diabetes Medication(s)     Diabetic Other       glucagon (GLUCAGON EMERGENCY) 1 MG kit    Inject 1 mg intramuscular for unconscious hypoglycemia only. Please schedule clinic visit for Refills    Insulin       insulin glargine (LANTUS SOLOSTAR) 100 UNIT/ML pen    Inject 36 units once daily in case of pump failure; wait 24 hours before starting back insulin pump after injecting Lantus.     insulin lispro (HUMALOG) 100 UNIT/ML vial    Using up to 100 units daily. Please be seen in clinic  for future refills.        Monitoring  Patient glucose self monitoring as follows: four times daily.   BG meter: unknown meter  BG results: blood glucose results were not scrutinized today.     Patient's most recent   Lab Results   Component Value Date    A1C 10.0 12/15/2016    is not meeting goal of <7.0                   EDUCATION and INSTRUCTION PROVIDED AT THIS VISIT:      She would like to get her sensor started.  She has never used the 670G Guardian 3 or any other type of sensor before.  We walked through the calibration requirements, video aids to helping her start her sensor, and how it works with the pump software to modulate basal rates.  I think she will need additional assistance to get Automode in her pump to work to best effect.  We had some technical difficulty so unable to do a video visit today.  Will try to get this done next week.  Explained that we should wait until we have some sensor data before we put her pump in auto mode.      Discussed in general the things that she should be aware of in terms of Covid-19 and having diabetes.  Explained that it can result in sometimes high or sometimes lower blood glucoses depending on whether or not the GI tract is affected and how the infection affects appetite, etc.    Reviewed how to manage high blood glucoses.  She has ketone strips and knows how to check.   Feels comfortable with monitoring and when to notify the medical team.  She states that at this point she has sort of a wet, loose cough, but doesn't feel short of breath and does not feel ill at all.    Patient-stated goal written and given to Jerrell Hernandez.  Verbalized and demonstrated understanding of instructions.     PLAN:  Email with some instructions regarding using the Medtronic sensor sent to her.    Follow up appointment next Tuesday at 4pm by video to review how things are going.  Given the contact information for Endo on call in case she starts to notice changes with her blood glucoses and needs assistance.     See patient instructions  AVS printed and given to patient    FOLLOW-UP:    She is going to try to start her sensor, and we will re-meet in one week.      Time spent with patient at today's visit was 20 minutes.      Any diabetes medication dose changes were made via the CDE Protocol and Collaborative Practice Agreement with Julio Cesar and  [place].  A copy of this encounter was provided to patient's referring provider.

## 2020-09-21 DIAGNOSIS — E10.65 TYPE 1 DIABETES MELLITUS WITH HYPERGLYCEMIA (H): ICD-10-CM

## 2020-09-22 ENCOUNTER — VIRTUAL VISIT (OUTPATIENT)
Dept: EDUCATION SERVICES | Facility: CLINIC | Age: 20
End: 2020-09-22
Payer: COMMERCIAL

## 2020-09-22 DIAGNOSIS — E10.65 UNCONTROLLED TYPE 1 DIABETES MELLITUS WITH HYPERGLYCEMIA (H): Primary | ICD-10-CM

## 2020-09-22 NOTE — TELEPHONE ENCOUNTER
insulin lispro (HUMALOG) 100 UNIT/ML vial   Last Written Prescription Date:  3/16/20  Last Fill Quantity: 100 ml,   # refills: 1  Last Office Visit : 5/6/20  Future Office visit:  9/23/20 Maleab    Routing refill request to provider for review/approval because:  Insulin - refilled per clinic   Care Everywhere reviewed   Ref. Range 12/26/2019 00:00   Hemoglobin A1C Latest Ref Range: 4.3 - 6 % 8.9 (A)       Ref. Range 5/12/2019 19:09   Creatinine Latest Ref Range: 0.52 - 1.04 mg/dL 0.68

## 2020-09-23 ENCOUNTER — VIRTUAL VISIT (OUTPATIENT)
Dept: ENDOCRINOLOGY | Facility: CLINIC | Age: 20
End: 2020-09-23
Payer: COMMERCIAL

## 2020-09-23 DIAGNOSIS — E10.65 TYPE 1 DIABETES MELLITUS WITH HYPERGLYCEMIA (H): Primary | ICD-10-CM

## 2020-09-23 NOTE — PROGRESS NOTES
Patient did not answer the phone on multiple occasions the day prior to her visit and the day of her visit. She did not connect to the virtual visit in time for her visit. This will be marked as a no show.     Sabiha Parker MD  Endocrinology, Diabetes and Metabolism  [facility]

## 2020-09-23 NOTE — LETTER
9/23/2020       RE: Jerrell Hernandez  [address] [place] [address]     Dear Colleague,    Thank you for referring your patient, Jerrell Hernandez, to the [facility] ENDOCRINOLOGY at [facility]. Please see a copy of my visit note below.    Patient did not answer the phone on multiple occasions the day prior to her visit and the day of her visit. She did not connect to the virtual visit in time for her visit. This will be marked as a no show.     Sabiha Parker MD  Endocrinology, Diabetes and Metabolism  [facility]      Again, thank you for allowing me to participate in the care of your patient.      Sincerely,    Sabiha Parker MD

## 2020-09-23 NOTE — LETTER
Date:September 26, 2020      Provider requested that no letter be sent. Do not send.       [facility] Health Information

## 2020-09-24 NOTE — PROGRESS NOTES
Diabetes Education Note:    Jerrell did not appear for scheduled video visit.  Call placed after 15 minutes but did not answer or return call.  Will call to re-schedule, if desired.     SONI MulliganN, RN, [facility], CDTC  Certified Diabetes Care and   [facility] Endocrinology and Diabetes  [facility]  Clinic [phone]  Phone [phone] (voicemail)  Email:  [email or web address]

## 2020-11-16 ENCOUNTER — HEALTH MAINTENANCE LETTER (OUTPATIENT)
Age: 20
End: 2020-11-16

## 2021-05-29 ENCOUNTER — HEALTH MAINTENANCE LETTER (OUTPATIENT)
Age: 21
End: 2021-05-29

## 2021-09-18 ENCOUNTER — HEALTH MAINTENANCE LETTER (OUTPATIENT)
Age: 21
End: 2021-09-18

## 2021-10-27 DIAGNOSIS — E10.65 TYPE 1 DIABETES MELLITUS WITH HYPERGLYCEMIA (H): ICD-10-CM

## 2021-10-27 NOTE — TELEPHONE ENCOUNTER
[facility] Call Center    Phone Message    May a detailed message be left on voicemail: yes     Reason for Call: Medication Refill Request    Has the patient contacted the pharmacy for the refill? Yes   Name of medication being requested: insulin lispro (HUMALOG) 100 UNIT/ML vial  Provider who prescribed the medication: Connor  Pharmacy: [id number] [place] - [address] [place] [address]  Date medication is needed: whenever possible- pt is running low.     Action Taken: Message routed to:  [facility] ([facility] endo    Travel Screening: Not Applicable

## 2021-10-27 NOTE — TELEPHONE ENCOUNTER
insulin lispro (HUMALOG) 100 UNIT/ML vial      Last Written Prescription Date:  9-  Last Fill Quantity: 100,   # refills: 3  Last Office Visit : 9-  Future Office visit:  11-    Routing refill request to provider for review/approval because:  Insulin - refilled per clinic          Kathleen M Doege RN

## 2021-11-17 NOTE — PROGRESS NOTES
Jerrell is a 21 year old who is being evaluated via a billable video visit.      How would you like to obtain your AVS? Exhbit  If the video visit is dropped, the invitation should be resent by: Send to e-mail at: [email or web address]  Will anyone else be joining your video visit? No      Video Start Time:   Video-Visit Details    Type of service:  Video Visit    Video End Time:      Distant Location (provider location):  [facility] ENDOCRINOLOGY [facility]     Platform used for Video Visit:   Outcome for 11/17/21 9:20 AM: Sent invite to connect to clinic for sharing   Patient will share her reading via [employer] this afternoon. She isn't sure how to upload on the medronic site. I will send instructions via [employer].   11.18: Patient now has uploaded and emailed to Provider

## 2021-11-18 ENCOUNTER — VIRTUAL VISIT (OUTPATIENT)
Dept: ENDOCRINOLOGY | Facility: CLINIC | Age: 21
End: 2021-11-18
Payer: COMMERCIAL

## 2021-11-18 ENCOUNTER — TELEPHONE (OUTPATIENT)
Dept: ENDOCRINOLOGY | Facility: CLINIC | Age: 21
End: 2021-11-18

## 2021-11-18 DIAGNOSIS — E10.65 TYPE 1 DIABETES MELLITUS WITH HYPERGLYCEMIA (H): Primary | ICD-10-CM

## 2021-11-18 PROCEDURE — 99215 OFFICE O/P EST HI 40 MIN: CPT | Mod: GT | Performed by: PHYSICIAN ASSISTANT

## 2021-11-18 RX ORDER — INSULIN GLARGINE 100 [IU]/ML
INJECTION, SOLUTION SUBCUTANEOUS
Qty: 10 ML | Refills: 1 | Status: SHIPPED | OUTPATIENT
Start: 2021-11-18 | End: 2022-07-12

## 2021-11-18 RX ORDER — GLUCAGON 3 MG/1
3 POWDER NASAL SEE ADMIN INSTRUCTIONS
Qty: 1 EACH | Refills: 3 | Status: SHIPPED | OUTPATIENT
Start: 2021-11-18 | End: 2023-05-31

## 2021-11-18 ASSESSMENT — ENCOUNTER SYMPTOMS
POLYDIPSIA: 1
MUSCLE WEAKNESS: 1
JAUNDICE: 0
RECTAL PAIN: 0
DECREASED APPETITE: 0
TREMORS: 0
POLYPHAGIA: 1
HEADACHES: 1
NAIL CHANGES: 0
HEARTBURN: 0
BLOATING: 1
TINGLING: 0
ABDOMINAL PAIN: 1
WEIGHT LOSS: 0
CONSTIPATION: 1
POOR WOUND HEALING: 0
FATIGUE: 1
MUSCLE CRAMPS: 1
BOWEL INCONTINENCE: 0
DEPRESSION: 1
NIGHT SWEATS: 0
NAUSEA: 1
ALTERED TEMPERATURE REGULATION: 0
JOINT SWELLING: 0
INSOMNIA: 1
DISTURBANCES IN COORDINATION: 0
INCREASED ENERGY: 1
NECK PAIN: 0
LOSS OF CONSCIOUSNESS: 0
BACK PAIN: 1
PANIC: 0
DIARRHEA: 1
DIFFICULTY URINATING: 0
DIZZINESS: 0
BLOOD IN STOOL: 0
FEVER: 0
SPEECH CHANGE: 0
NERVOUS/ANXIOUS: 1
NUMBNESS: 0
HEMATURIA: 0
DYSURIA: 0
MEMORY LOSS: 0
HOT FLASHES: 0
WEAKNESS: 0
DECREASED LIBIDO: 0
DECREASED CONCENTRATION: 1
PARALYSIS: 0
VOMITING: 1
SEIZURES: 0
MYALGIAS: 1
WEIGHT GAIN: 0
FLANK PAIN: 0
SKIN CHANGES: 0
CHILLS: 0

## 2021-11-18 NOTE — PROGRESS NOTES
Due to the COVID 19 pandemic this visit was converted to a video visit in order to help prevent spread of infection in this patient and the general population.    Time of start: 9:00 am  Time of end: 9:34 am  Total duration of video visit: 34 minutes.    TERRI Hernandez is a 21 year old female with type 1 diabetes mellitus. Video visit for diabetes follow up today.  She had a virtual visit with Dr. Parker in May 2021.  She is currently at her parents home in [place] and attending graduate school at [employer].  Pt gives a hx of type 1 diabetes mellitus dx at age 9.  She denies any known hx of retinopathy, nephropathy or neuropathy  Only other med includes hx of compartment syndrome and s/p appy.  For her diabetes, she is using a Medtronic insulin pump and no sensor.  She is not sure if her Medtronic pump is a 630G or 770G insulin pump. She has had this pump for > 1 year.  I had her read me her insulin pump settings which are below:  Midnight= 1.0 units/hr.  8 am = 1.3 units/hr.  Noon = 1.7 units/hr.  10 pm = 1.3 units/hr.  I/C ratio:  Midnight= 1:8  6 am = 1:5.5  10 am = 1.55  9 pm 1:6.5  CF is 50 at midnight, 40 at 6 am and 50 at 9 pm.  I have no blood sugar data at this time.  She denies frequent hypoglycemia.  Most recent A1C was 8.9 % in Aug 2019.  On ROS today, she tells me she had a college friend recently pass away from DKA.  She states she was ill this summer and had ketones, but was able to self treat her hyperglycemia.  She denies hx of frequent DKA.  On ROS today, headaches.  LMP was 2 weeks ago. She has an IUD.  She received both COVID19 vaccines. No booster yet.  Intermittent loose stools.  She denies blurred vision, n/v, SOB at rest, cough, fever or chills.  No chest pain.  She denies abd pain, chronic diarrhea, dysuria or hematuria.  No sx of neuropathy or foot ulcers.  Pt denies headaches, visual changes, n/v, SOB at rest, chest pain, abd pain, diarrhea, dysuria, hematuria or foot  ulcers.    Diabetes Care  Retinopathy:none; pt seen by Oph last year.  Nephropathy:none; urine microalbuminuria negative in 3/18883.  Neuropathy:none.  Foot Exam:no exam.  Taking aspirin:no.  Lipids::LDL 79 in 3/2018.  Insulin : using a Medtronic insulin pump- she is not sure if it is the 630G or 770G insulin pump.  Testing: glucose meter.  Hypoglycemia tx: Rx for Baqsimi sent to pharmacy today to use in case of severe hypoglycemia.  Back up insulin: Lantus vial and syringes ordered today to use in case of insulin pump failure.    ROS  See under HPI.    Allergies  Allergies   Allergen Reactions     Amoxicillin      Zithromax [Azithromycin]        Medications  Current Outpatient Medications   Medication Sig Dispense Refill     acetone urine (KETOSTIX) test strip Patient to test when blood glucose is >300x2 or when sick and vomiting. Please schedule clinic visit 150 each 0     blood glucose monitoring (ARLINE CONTOUR NEXT) test strip Use to test blood sugar 13 times daily during dance season. 1200 each 4     blood glucose monitoring (ONETOUCH ULTRA) test strip Patient tests 8 times/day 720 each 3     Glucagon (BAQSIMI ONE PACK) 3 MG/DOSE POWD Spray 3 mg in nostril See Admin Instructions USE ONLY FOR SEVERE HYPOGLYCEMIA. 1 each 3     insulin glargine (LANTUS VIAL) 100 UNIT/ML vial Inject 24 units subcutaneous daily ONLY IF INSULIN PUMP FAILS. 10 mL 1     insulin lispro (HUMALOG) 100 UNIT/ML vial Using up to 100 units daily in insulin pump. 100 mL 3     insulin syringes, disposable, U-100 0.3 ML MISC Use only for insulin injection in case insulin pump fails. 25 each 1     norgestimate-ethinyl estradiol (ORTHO-CYCLEN, SPRINTEC) 0.25-35 MG-MCG per tablet Take 1 tablet by mouth daily (Patient not taking: Reported on 11/18/2021) 84 tablet 3       Family History  family history includes Diabetes in an other family member; Thyroid Disease in her paternal grandmother.    Social History   reports that she has never smoked. She  has never used smokeless tobacco. She reports current alcohol use. She reports that she does not use drugs.     Past Medical History  Past Medical History:   Diagnosis Date     Chronic generalized abdominal pain     constipation     Type I (juvenile type) diabetes mellitus without mention of complication, uncontrolled 2009    previosuly followed at [facility]       Past Surgical History:   Procedure Laterality Date     ADENOIDECTOMY       APPENDECTOMY  2009     SINUS SURGERY         Physical Exam    No vitals today.    RESULTS  Creatinine   Date Value Ref Range Status   05/12/2019 0.68 0.52 - 1.04 mg/dL Final     GFR Estimate   Date Value Ref Range Status   05/12/2019 >90 >60 mL/min/[1.73_m2] Final     Comment:     Non  GFR Calc  Starting 12/18/2018, serum creatinine based estimated GFR (eGFR) will be   calculated using the Chronic Kidney Disease Epidemiology Collaboration   (CKD-EPI) equation.       Hemoglobin A1C   Date Value Ref Range Status   12/15/2016 10.0 % Final     Potassium   Date Value Ref Range Status   05/12/2019 3.6 3.4 - 5.3 mmol/L Final     ALT   Date Value Ref Range Status   05/12/2019 19 0 - 50 U/L Final     AST   Date Value Ref Range Status   05/12/2019 7 0 - 45 U/L Final     TSH   Date Value Ref Range Status   12/26/2019 1.29 0.40 - 4.00 mU/L Final       Cholesterol   Date Value Ref Range Status   03/30/2018 132 <170 mg/dL Final   12/04/2014 154 <170 mg/dL Final     Comment:     LDL Cholesterol is the primary guide to therapy.   The NCEP recommends further evaluation of: patients with cholesterol greater   than 200 mg/dL if additional risk factors are present, cholesterol greater   than   240 mg/dL, triglycerides greater than 150 mg/dL, or HDL less than 40 mg/dL.       HDL Cholesterol   Date Value Ref Range Status   03/30/2018 39 (L) >45 mg/dL Final     Comment:     Low:             <40 mg/dl  Borderline low:   40-45 mg/dl     12/04/2014 33 (L) >45 mg/dL Final     LDL Cholesterol  Calculated   Date Value Ref Range Status   03/30/2018 79 <110 mg/dL Final   12/04/2014 78 0 - 129 mg/dL Final     Comment:     LDL Cholesterol is the primary guide to therapy: LDL-cholesterol goal in high   risk patients is <100 mg/dL and in very high risk patients is <70 mg/dL.       Triglycerides   Date Value Ref Range Status   03/30/2018 70 <90 mg/dL Final   12/04/2014 217 (H) 0 - 150 mg/dL Final     Cholesterol/HDL Ratio   Date Value Ref Range Status   12/04/2014 4.7 0.0 - 5.0 Final   10/25/2012 4.6 0.0 - 5.0 Final         ASSESSMENT/PLAN:    1.  TYPE 1 DIABETES MELLITUS: No change in insulin pump settings today.   I will have her meet with the CDE to discuss use of a DexcomG6 sensor and for additional diabetes ed.  I placed an order for Baqsimi nasal glucagon to use in case of severe hypoglycemia.  I also sent a RX to pharmacy for Lantus vial/syringes to use in case of insulin pump failure.  She is to schedule an O[facility] exam.  Pt denies sx of neuropathy. Denies foot ulcers.  Her urine microalbuminuria was negative in 3/2019.  No BP today.  Pt received the COVID19 vaccines. No COVID booster yet.  She also received the flu vaccine this Fall.    2.  HX OF INTERMITTENT LOOSE STOOLS: Labs for celiac ordered.    3.  MENTAL STATUS: She denies feeling depressed at this time.  She states she is taking Sertraline 50 mg daily.    4.  FOLLOW UP: with me in Jan 2022.  She is to see the CDE in Dec 2021.  I placed an order for annual fasting diabetes labs including an A1C today.  Also check celiac labs.    Time spent reviewing chart and labs today = 10 minutes.  Time for video visit today = 34 minutes.  Time for documentation today = 15 minutes.    TOTAL TIME FOR VISIT TODAY = 59 minutes.    Corina Rosario PA-C

## 2021-11-18 NOTE — TELEPHONE ENCOUNTER
----- Message from Corina Rosario PA-C sent at 11/18/2021 10:15 AM CST -----  I ordered labs for patient.  She prefers to have the labs done near her parent's home.  Can you please send a lab slip to her parents home in [facility].  Thanks radha

## 2021-11-18 NOTE — LETTER
11/18/2021       RE: Jerrell Hernandez  [address] [place] [address]     Dear Colleague,    Thank you for referring your patient, Jerrell Hernandez, to the [facility]. Please see a copy of my visit note below.    Jerrell is a 21 year old who is being evaluated via a billable video visit.      How would you like to obtain your AVS? [employer]  If the video visit is dropped, the invitation should be resent by: Send to e-mail at: [email or web address]  Will anyone else be joining your video visit? No      Video Start Time:   Video-Visit Details    Type of service:  Video Visit    Video End Time:      Distant Location (provider location):  [facility]     Platform used for Video Visit:   Outcome for 11/17/21 9:20 AM: Sent invite to connect to clinic for sharing   Patient will share her reading via Thrill this afternoon. She isn't sure how to upload on the medronic site. I will send instructions via Thrill.   11.18: Patient now has uploaded and emailed to Provider    Due to the COVID 19 pandemic this visit was converted to a video visit in order to help prevent spread of infection in this patient and the general population.    Time of start: 9:00 am  Time of end: 9:34 am  Total duration of video visit: 34 minutes.    TERRI Hernandez is a 21 year old female with type 1 diabetes mellitus. Video visit for diabetes follow up today.  She had a virtual visit with Dr. Parker in May 2021.  She is currently at her parents home in [place] and attending graduate school at [employer].  Pt gives a hx of type 1 diabetes mellitus dx at age 9.  She denies any known hx of retinopathy, nephropathy or neuropathy  Only other med includes hx of compartment syndrome and s/p appy.  For her diabetes, she is using a Medtronic insulin pump and no sensor.  She is not sure if her Medtronic pump is a  630G or 770G insulin pump. She has had this pump for > 1 year.  I had her read me her insulin pump settings which are below:  Midnight= 1.0 units/hr.  8 am = 1.3 units/hr.  Noon = 1.7 units/hr.  10 pm = 1.3 units/hr.  I/C ratio:  Midnight= 1:8  6 am = 1:5.5  10 am = 1.55  9 pm 1:6.5  CF is 50 at midnight, 40 at 6 am and 50 at 9 pm.  I have no blood sugar data at this time.  She denies frequent hypoglycemia.  Most recent A1C was 8.9 % in Aug 2019.  On ROS today, she tells me she had a college friend recently pass away from DKA.  She states she was ill this summer and had ketones, but was able to self treat her hyperglycemia.  She denies hx of frequent DKA.  On ROS today, headaches.  LMP was 2 weeks ago. She has an IUD.  She received both COVID19 vaccines. No booster yet.  Intermittent loose stools.  She denies blurred vision, n/v, SOB at rest, cough, fever or chills.  No chest pain.  She denies abd pain, chronic diarrhea, dysuria or hematuria.  No sx of neuropathy or foot ulcers.  Pt denies headaches, visual changes, n/v, SOB at rest, chest pain, abd pain, diarrhea, dysuria, hematuria or foot ulcers.    Diabetes Care  Retinopathy:none; pt seen by O[facility] last year.  Nephropathy:none; urine microalbuminuria negative in 3/11705.  Neuropathy:none.  Foot Exam:no exam.  Taking aspirin:no.  Lipids::LDL 79 in 3/2018.  Insulin : using a Medtronic insulin pump- she is not sure if it is the 630G or 770G insulin pump.  Testing: glucose meter.  Hypoglycemia tx: Rx for Baqsimi sent to pharmacy today to use in case of severe hypoglycemia.  Back up insulin: Lantus vial and syringes ordered today to use in case of insulin pump failure.    ROS  See under HPI.    Allergies  Allergies   Allergen Reactions     Amoxicillin      Zithromax [Azithromycin]        Medications  Current Outpatient Medications   Medication Sig Dispense Refill     acetone urine (KETOSTIX) test strip Patient to test when blood glucose is >300x2 or when sick and  vomiting. Please schedule clinic visit 150 each 0     blood glucose monitoring (ARLINE CONTOUR NEXT) test strip Use to test blood sugar 13 times daily during dance season. 1200 each 4     blood glucose monitoring (ONETOUCH ULTRA) test strip Patient tests 8 times/day 720 each 3     Glucagon (BAQSIMI ONE PACK) 3 MG/DOSE POWD Spray 3 mg in nostril See Admin Instructions USE ONLY FOR SEVERE HYPOGLYCEMIA. 1 each 3     insulin glargine (LANTUS VIAL) 100 UNIT/ML vial Inject 24 units subcutaneous daily ONLY IF INSULIN PUMP FAILS. 10 mL 1     insulin lispro (HUMALOG) 100 UNIT/ML vial Using up to 100 units daily in insulin pump. 100 mL 3     insulin syringes, disposable, U-100 0.3 ML MISC Use only for insulin injection in case insulin pump fails. 25 each 1     norgestimate-ethinyl estradiol (ORTHO-CYCLEN, SPRINTEC) 0.25-35 MG-MCG per tablet Take 1 tablet by mouth daily (Patient not taking: Reported on 11/18/2021) 84 tablet 3       Family History  family history includes Diabetes in an other family member; Thyroid Disease in her paternal grandmother.    Social History   reports that she has never smoked. She has never used smokeless tobacco. She reports current alcohol use. She reports that she does not use drugs.     Past Medical History  Past Medical History:   Diagnosis Date     Chronic generalized abdominal pain     constipation     Type I (juvenile type) diabetes mellitus without mention of complication, uncontrolled 2009    [facility]uly followed at [facility]       Past Surgical History:   Procedure Laterality Date     ADENOIDECTOMY       APPENDECTOMY  2009     SINUS SURGERY         Physical Exam    No vitals today.    RESULTS  Creatinine   Date Value Ref Range Status   05/12/2019 0.68 0.52 - 1.04 mg/dL Final     GFR Estimate   Date Value Ref Range Status   05/12/2019 >90 >60 mL/min/[1.73_m2] Final     Comment:     Non  GFR Calc  Starting 12/18/2018, serum creatinine based estimated GFR (eGFR) will be    calculated using the Chronic Kidney Disease Epidemiology Collaboration   (CKD-EPI) equation.       Hemoglobin A1C   Date Value Ref Range Status   12/15/2016 10.0 % Final     Potassium   Date Value Ref Range Status   05/12/2019 3.6 3.4 - 5.3 mmol/L Final     ALT   Date Value Ref Range Status   05/12/2019 19 0 - 50 U/L Final     AST   Date Value Ref Range Status   05/12/2019 7 0 - 45 U/L Final     TSH   Date Value Ref Range Status   12/26/2019 1.29 0.40 - 4.00 mU/L Final       Cholesterol   Date Value Ref Range Status   03/30/2018 132 <170 mg/dL Final   12/04/2014 154 <170 mg/dL Final     Comment:     LDL Cholesterol is the primary guide to therapy.   The NCEP recommends further evaluation of: patients with cholesterol greater   than 200 mg/dL if additional risk factors are present, cholesterol greater   than   240 mg/dL, triglycerides greater than 150 mg/dL, or HDL less than 40 mg/dL.       HDL Cholesterol   Date Value Ref Range Status   03/30/2018 39 (L) >45 mg/dL Final     Comment:     Low:             <40 mg/dl  Borderline low:   40-45 mg/dl     12/04/2014 33 (L) >45 mg/dL Final     LDL Cholesterol Calculated   Date Value Ref Range Status   03/30/2018 79 <110 mg/dL Final   12/04/2014 78 0 - 129 mg/dL Final     Comment:     LDL Cholesterol is the primary guide to therapy: LDL-cholesterol goal in high   risk patients is <100 mg/dL and in very high risk patients is <70 mg/dL.       Triglycerides   Date Value Ref Range Status   03/30/2018 70 <90 mg/dL Final   12/04/2014 217 (H) 0 - 150 mg/dL Final     Cholesterol/HDL Ratio   Date Value Ref Range Status   12/04/2014 4.7 0.0 - 5.0 Final   10/25/2012 4.6 0.0 - 5.0 Final         ASSESSMENT/PLAN:    1.  TYPE 1 DIABETES MELLITUS: No change in insulin pump settings today.   I will have her meet with the CDE to discuss use of a DexcomG6 sensor and for additional diabetes ed.  I placed an order for Baqsimi nasal glucagon to use in case of severe hypoglycemia.  I also sent a  RX to pharmacy for Lantus vial/syringes to use in case of insulin pump failure.  She is to schedule an Oph exam.  Pt denies sx of neuropathy. Denies foot ulcers.  Her urine microalbuminuria was negative in 3/2019.  No BP today.  Pt received the COVID19 vaccines. No COVID booster yet.  She also received the flu vaccine this Fall.    2.  HX OF INTERMITTENT LOOSE STOOLS: Labs for celiac ordered.    3.  MENTAL STATUS: She denies feeling depressed at this time.  She states she is taking Sertraline 50 mg daily.    4.  FOLLOW UP: with me in Jan 2022.  She is to see the CDE in Dec 2021.  I placed an order for annual fasting diabetes labs including an A1C today.  Also check celiac labs.    Time spent reviewing chart and labs today = 10 minutes.  Time for video visit today = 34 minutes.  Time for documentation today = 15 minutes.    TOTAL TIME FOR VISIT TODAY = 59 minutes.    Corina Rosario PA-C

## 2021-11-18 NOTE — TELEPHONE ENCOUNTER
Lab slip mailed to Pt via [employer]PS per Corina Rosario. Copy on file.   Claudia Taylor RN on 11/18/2021 at 12:46 PM

## 2021-11-19 ASSESSMENT — PATIENT HEALTH QUESTIONNAIRE - PHQ9: SUM OF ALL RESPONSES TO PHQ QUESTIONS 1-9: 8

## 2021-11-29 ENCOUNTER — MYC MEDICAL ADVICE (OUTPATIENT)
Dept: EDUCATION SERVICES | Facility: CLINIC | Age: 21
End: 2021-11-29
Payer: COMMERCIAL

## 2021-12-11 ENCOUNTER — TELEPHONE (OUTPATIENT)
Dept: ENDOCRINOLOGY | Facility: CLINIC | Age: 21
End: 2021-12-11
Payer: COMMERCIAL

## 2021-12-11 NOTE — TELEPHONE ENCOUNTER
FRANK and sent P4RChart:    Video visit with Abraham in Jan 2022.   Future labs ordered.   Please schedule pt to see Brigid Petit CDE or Cesia Mccauley CDE soon

## 2021-12-15 ENCOUNTER — CARE COORDINATION (OUTPATIENT)
Dept: EDUCATION SERVICES | Facility: CLINIC | Age: 21
End: 2021-12-15
Payer: COMMERCIAL

## 2021-12-15 ENCOUNTER — TELEPHONE (OUTPATIENT)
Dept: ENDOCRINOLOGY | Facility: CLINIC | Age: 21
End: 2021-12-15
Payer: COMMERCIAL

## 2021-12-15 DIAGNOSIS — E10.65 UNCONTROLLED TYPE 1 DIABETES MELLITUS WITH HYPERGLYCEMIA (H): Primary | ICD-10-CM

## 2021-12-15 RX ORDER — PROCHLORPERAZINE 25 MG/1
SUPPOSITORY RECTAL
Qty: 9 EACH | Refills: 3 | Status: SHIPPED | OUTPATIENT
Start: 2021-12-15 | End: 2023-01-05

## 2021-12-15 RX ORDER — PROCHLORPERAZINE 25 MG/1
SUPPOSITORY RECTAL
Qty: 1 EACH | Refills: 3 | Status: SHIPPED | OUTPATIENT
Start: 2021-12-15 | End: 2023-02-13

## 2021-12-15 NOTE — PROGRESS NOTES
Diabetes Educator Note:    Order for Dexcom sensors and transmitter sent to [facility].    Jerrell is going to let me know if she needs the .

## 2021-12-15 NOTE — TELEPHONE ENCOUNTER
PA Initiation    Medication: Dexcom G6 PA Pending  Insurance Company: [employer] ([facility]) - Phone [phone] Fax [phone]  Pharmacy Filling the Rx:    Filling Pharmacy Phone:    Filling Pharmacy Fax:    Start Date: 12/15/2021

## 2021-12-24 NOTE — TELEPHONE ENCOUNTER
Prior Authorization Approval    Authorization Effective Date: 12/15/2021  Authorization Expiration Date: 12/15/2022  Medication: Dexcom G6 PA Approved  Approved Dose/Quantity:   Reference #: [id number]   Insurance Company: [employer] ([facility]) - Phone [phone] Fax [phone]  Expected CoPay:       CoPay Card Available:      Foundation Assistance Needed:    Which Pharmacy is filling the prescription (Not needed for infusion/clinic administered):    Pharmacy Notified:    Patient Notified:

## 2021-12-28 ENCOUNTER — MEDICAL CORRESPONDENCE (OUTPATIENT)
Dept: HEALTH INFORMATION MANAGEMENT | Facility: CLINIC | Age: 21
End: 2021-12-28
Payer: COMMERCIAL

## 2021-12-28 DIAGNOSIS — E10.65 UNCONTROLLED TYPE 1 DIABETES MELLITUS WITH HYPERGLYCEMIA (H): Primary | ICD-10-CM

## 2021-12-28 RX ORDER — PROCHLORPERAZINE 25 MG/1
SUPPOSITORY RECTAL
Qty: 1 EACH | Refills: 0 | Status: SHIPPED | OUTPATIENT
Start: 2021-12-28 | End: 2023-01-05

## 2021-12-28 NOTE — PROGRESS NOTES
CMN for Dexcom G6  faxed to [employer]. Pt sent mychart stating she will need the , something is wrong with her phone.

## 2021-12-29 ENCOUNTER — TELEPHONE (OUTPATIENT)
Dept: ENDOCRINOLOGY | Facility: CLINIC | Age: 21
End: 2021-12-29
Payer: COMMERCIAL

## 2021-12-29 ENCOUNTER — MEDICAL CORRESPONDENCE (OUTPATIENT)
Dept: HEALTH INFORMATION MANAGEMENT | Facility: CLINIC | Age: 21
End: 2021-12-29
Payer: COMMERCIAL

## 2021-12-29 NOTE — TELEPHONE ENCOUNTER
[facility] Call Center    Phone Message    May a detailed message be left on voicemail: yes     Reason for Call: Other: Per caller pharmacy wanted them to call to follow up on refill request caller mom katarina wants to know if Corina Rosario is not in can another provider approve the refill and would like to speak to nurse asap.      Action Taken: Message routed to:  Other: ENDO    Travel Screening: Not Applicable

## 2021-12-30 NOTE — TELEPHONE ENCOUNTER
Returned mom's call.  She stated that she was told by pharmacy to contact our offices to get a refill of supplies for pt's Medtronic Pump.  Review of chart does not show Pump on pt's medication list.  However, in the Media tab there is a scan of an Medtronic Revel Order form dated 6/272019.  Pt saw S Rosario 11/18/2021.  Advised mom I will review further with other staff.  Mom agreed.  Alexandre Madrid LPN on 12/30/2021 at 9:29 AM

## 2022-01-03 ENCOUNTER — DOCUMENTATION ONLY (OUTPATIENT)
Dept: ENDOCRINOLOGY | Facility: CLINIC | Age: 22
End: 2022-01-03
Payer: COMMERCIAL

## 2022-01-06 ENCOUNTER — VIRTUAL VISIT (OUTPATIENT)
Dept: EDUCATION SERVICES | Facility: CLINIC | Age: 22
End: 2022-01-06
Payer: COMMERCIAL

## 2022-01-06 DIAGNOSIS — E10.65 UNCONTROLLED TYPE 1 DIABETES MELLITUS WITH HYPERGLYCEMIA (H): Primary | ICD-10-CM

## 2022-01-06 PROCEDURE — G0108 DIAB MANAGE TRN  PER INDIV: HCPCS | Mod: GT | Performed by: REGISTERED NURSE

## 2022-01-07 NOTE — PROGRESS NOTES
Video-Visit Details    Type of service:  Video Visit  Patient consented to video visit  Video Start Time:  1602  Video End Time:   1700  Originating Location (pt. Location): Home  Distant Location (provider location):   [place] DIABETES EDUCATION [place]   Platform used for Video Visit: Zachary Prell     Diabetes Self-Management Education & Support    Jerrell Hernandez presents today for education related to Type 1 diabetes    Patient is being treated with:  insulin pump  She is accompanied by self    Year of diagnosis: 2009 at age 9  Referring provider:  Araseli Rosario Pa-C  Living Situation: Lives alone  Employment: Currently a PhD student at the [employer]    PATIENT CONCERNS RELATED TO DIABETES SELF MANAGEMENT:     Here today to start using her Dexcom G6 CGM.       ASSESSMENT:    Taking Medication:     Current Diabetes Management per Patient:  Taking diabetes medications?   yes:     Diabetes Medication(s)     Diabetic Other       Glucagon (BAQSIMI ONE PACK) 3 MG/DOSE POWD    Spray 3 mg in nostril See Admin Instructions USE ONLY FOR SEVERE HYPOGLYCEMIA.    Insulin       insulin glargine (LANTUS VIAL) 100 UNIT/ML vial    Inject 24 units subcutaneous daily ONLY IF INSULIN PUMP FAILS.     insulin lispro (HUMALOG) 100 UNIT/ML vial    Using up to 100 units daily in insulin pump.          Monitoring    Patient glucose self monitoring as follows: four times daily  BG meter: Contour Next USB Link (with Medtronic Pump)  meter  BG results: Not reviewed today.      Patient's most recent   Lab Results   Component Value Date    A1C 10.0 12/15/2016      EDUCATION and INSTRUCTION PROVIDED AT THIS VISIT:       Patient was instructed in the following skills:    Explanation of difference between blood glucose and sensor glucose.  Sensor glucose will tend to trail behind the blood glucose by a few minutes.    Reviewed the meaning of the rate of change arrows.    Discussed simply using the G6 as a  "monitoring tool for now, until used to recognizing patterns and cautioned against reacting to higher numbers by administering short acting insulin until there is a better understanding of patterns.   Insertion of sensor, technique, angle, site rotation, skin prep use, frequency of change.  Cautioned that no decisions about treatment of hypo- or hyperglycemia can be based on a sensor reading, but must be double checked with a blood glucose until reasonable certain of the sensors accuracy.    Programming settings:  Hi and low alerts, rate alerts, predictive alerts,  out-of-range alerts and fixed low alert of 55 mg/mL.  Patient was guided in putting settings into , i-Phone, or both.   Calibration requirements:  No calibration is required, however if he is starting to notice \"drifting,\" especially when blood glucose is relatively stable, then MAY calibrate with blood glucose.  Instructed not to do this if BG is changing rapidly, or during periods of hypoglycemia.   Basic Care of transmitter and :   Addressed ordering of supplies.  Currently ordering from [facility].  Also given [employer] customer support number for technical problems or to replace bad sensors.     CGM SETTINGS:    Settings:                 Hi glucose:  250 mg/dL  Low glucose: 80 mg/dL   Rise and Fall rates turned off.        Patient-stated goal written and given to Jerrell Hernandez.  Verbalized and demonstrated understanding of instructions.     PLAN:      See patient instructions  AVS printed and given to patient    FOLLOW-UP:      Currently doesn't have any follow up appointments with endocrinology or diabetes educators.     Time spent with patient at today's visit was 60  minutes.      Any diabetes medication dose changes were made via the CDE Protocol and Collaborative Practice Agreement with [facility].  A copy of this encounter was provided to patient's referring provider.  "

## 2022-01-08 ENCOUNTER — HEALTH MAINTENANCE LETTER (OUTPATIENT)
Age: 22
End: 2022-01-08

## 2022-06-20 ENCOUNTER — TRANSFERRED RECORDS (OUTPATIENT)
Dept: HEALTH INFORMATION MANAGEMENT | Facility: CLINIC | Age: 22
End: 2022-06-20

## 2022-06-20 LAB — RETINOPATHY: POSITIVE

## 2022-07-12 ENCOUNTER — OFFICE VISIT (OUTPATIENT)
Dept: ENDOCRINOLOGY | Facility: CLINIC | Age: 22
End: 2022-07-12
Payer: COMMERCIAL

## 2022-07-12 ENCOUNTER — TELEPHONE (OUTPATIENT)
Dept: EDUCATION SERVICES | Facility: CLINIC | Age: 22
End: 2022-07-12

## 2022-07-12 ENCOUNTER — LAB (OUTPATIENT)
Dept: LAB | Facility: CLINIC | Age: 22
End: 2022-07-12

## 2022-07-12 VITALS
BODY MASS INDEX: 27.29 KG/M2 | WEIGHT: 173.9 LBS | SYSTOLIC BLOOD PRESSURE: 130 MMHG | OXYGEN SATURATION: 100 % | DIASTOLIC BLOOD PRESSURE: 86 MMHG | HEIGHT: 67 IN | TEMPERATURE: 97.8 F | HEART RATE: 80 BPM

## 2022-07-12 DIAGNOSIS — E10.65 UNCONTROLLED TYPE 1 DIABETES MELLITUS WITH HYPERGLYCEMIA (H): ICD-10-CM

## 2022-07-12 DIAGNOSIS — E10.65 UNCONTROLLED TYPE 1 DIABETES MELLITUS WITH HYPERGLYCEMIA (H): Primary | ICD-10-CM

## 2022-07-12 LAB
AST SERPL W P-5'-P-CCNC: 10 U/L (ref 0–45)
BASOPHILS # BLD AUTO: 0.1 10E3/UL (ref 0–0.2)
BASOPHILS NFR BLD AUTO: 1 %
CREAT UR-MCNC: 194 MG/DL
EOSINOPHIL # BLD AUTO: 0.1 10E3/UL (ref 0–0.7)
EOSINOPHIL NFR BLD AUTO: 2 %
ERYTHROCYTE [DISTWIDTH] IN BLOOD BY AUTOMATED COUNT: 11.9 % (ref 10–15)
FERRITIN SERPL-MCNC: 27 NG/ML (ref 12–150)
HBA1C MFR BLD: 8.6 % (ref 4.3–?)
HCT VFR BLD AUTO: 43 % (ref 35–47)
HGB BLD-MCNC: 14.3 G/DL (ref 11.7–15.7)
IMM GRANULOCYTES # BLD: 0 10E3/UL
IMM GRANULOCYTES NFR BLD: 0 %
LYMPHOCYTES # BLD AUTO: 1.9 10E3/UL (ref 0.8–5.3)
LYMPHOCYTES NFR BLD AUTO: 33 %
MCH RBC QN AUTO: 28.7 PG (ref 26.5–33)
MCHC RBC AUTO-ENTMCNC: 33.3 G/DL (ref 31.5–36.5)
MCV RBC AUTO: 86 FL (ref 78–100)
MICROALBUMIN UR-MCNC: 10 MG/L
MICROALBUMIN/CREAT UR: 5.15 MG/G CR (ref 0–25)
MONOCYTES # BLD AUTO: 0.4 10E3/UL (ref 0–1.3)
MONOCYTES NFR BLD AUTO: 7 %
NEUTROPHILS # BLD AUTO: 3.4 10E3/UL (ref 1.6–8.3)
NEUTROPHILS NFR BLD AUTO: 57 %
NRBC # BLD AUTO: 0 10E3/UL
NRBC BLD AUTO-RTO: 0 /100
PLATELET # BLD AUTO: 231 10E3/UL (ref 150–450)
RBC # BLD AUTO: 4.99 10E6/UL (ref 3.8–5.2)
THYROPEROXIDASE AB SERPL-ACNC: 82 IU/ML
TSH SERPL DL<=0.005 MIU/L-ACNC: 3.74 MU/L (ref 0.4–4)
WBC # BLD AUTO: 5.8 10E3/UL (ref 4–11)

## 2022-07-12 PROCEDURE — 99215 OFFICE O/P EST HI 40 MIN: CPT | Performed by: PHYSICIAN ASSISTANT

## 2022-07-12 PROCEDURE — 83036 HEMOGLOBIN GLYCOSYLATED A1C: CPT | Performed by: PHYSICIAN ASSISTANT

## 2022-07-12 PROCEDURE — 84450 TRANSFERASE (AST) (SGOT): CPT | Performed by: PATHOLOGY

## 2022-07-12 PROCEDURE — 85025 COMPLETE CBC W/AUTO DIFF WBC: CPT | Performed by: PATHOLOGY

## 2022-07-12 PROCEDURE — 82728 ASSAY OF FERRITIN: CPT | Performed by: PATHOLOGY

## 2022-07-12 PROCEDURE — 82043 UR ALBUMIN QUANTITATIVE: CPT | Performed by: PATHOLOGY

## 2022-07-12 PROCEDURE — 84443 ASSAY THYROID STIM HORMONE: CPT | Performed by: PATHOLOGY

## 2022-07-12 PROCEDURE — 86376 MICROSOMAL ANTIBODY EACH: CPT | Performed by: PHYSICIAN ASSISTANT

## 2022-07-12 PROCEDURE — 36415 COLL VENOUS BLD VENIPUNCTURE: CPT | Performed by: PATHOLOGY

## 2022-07-12 PROCEDURE — 86364 TISS TRNSGLTMNASE EA IG CLAS: CPT | Performed by: PHYSICIAN ASSISTANT

## 2022-07-12 RX ORDER — INSULIN GLARGINE 100 [IU]/ML
INJECTION, SOLUTION SUBCUTANEOUS
Qty: 15 ML | Refills: 3 | Status: SHIPPED | OUTPATIENT
Start: 2022-07-12 | End: 2022-09-12

## 2022-07-12 RX ORDER — PEN NEEDLE, DIABETIC 32GX 5/32"
NEEDLE, DISPOSABLE MISCELLANEOUS
Qty: 300 EACH | Refills: 3 | Status: SHIPPED | OUTPATIENT
Start: 2022-07-12

## 2022-07-12 RX ORDER — INSULIN LISPRO 100 [IU]/ML
INJECTION, SOLUTION INTRAVENOUS; SUBCUTANEOUS
Qty: 15 ML | Refills: 3 | Status: SHIPPED | OUTPATIENT
Start: 2022-07-12 | End: 2022-10-05

## 2022-07-12 ASSESSMENT — PAIN SCALES - GENERAL: PAINLEVEL: NO PAIN (0)

## 2022-07-12 NOTE — NURSING NOTE
"Chief Complaint   Patient presents with     Diabetes     Vital signs:  Temp: 97.8  F (36.6  C) Temp src: Oral BP: 130/86 Pulse: 80     SpO2: 100 %     Height: 170.2 cm (5' 7\") Weight: 78.9 kg (173 lb 14.4 oz)  Estimated body mass index is 27.24 kg/m  as calculated from the following:    Height as of this encounter: 1.702 m (5' 7\").    Weight as of this encounter: 78.9 kg (173 lb 14.4 oz).          "

## 2022-07-12 NOTE — TELEPHONE ENCOUNTER
Digna Bustillos <[email or web address]>  [facility] will allow for medical review before we place pump order. They will need a narrative letter showing the medical necessity for a new pump along with notes reflecting the medical necessity. [facility] will review the request and either approve or deny. She can also move forward with our upgrade program as long as the plan is not managed by Medicare or Medicaid. Our upgrade is basically a rental. She will pay $999 for the pump and return the pump back to [facility] once she is eligible for a new pump with her insurance.         And From Pat P from [employer] may go through depending on insurance if pharm benefit is [employer]    Felicity BLAS, RN, [facility]  Certified Diabetes Care and   [facility] Endocrinology and Diabetes   [facility]  [address] [place] [address]  Phone [phone]

## 2022-07-12 NOTE — TELEPHONE ENCOUNTER
Pt is not happy with her Medtronic pump she has not been wearing it for some time. She had hospitalized for DKA x2. Has been off of the pump since April. Pt will call to find out OOW date.  She thinks sh has had it for less than 3 years. Pt is wearing a Dexcom. CDE will contact [employer] and [employer] to see if she has an options to upgrade.    Felicity BLAS, RN, [facility]  Certified Diabetes Care and   [facility] Endocrinology and Diabetes   Cl[facility] Center  [address] [place] [address]  Phone [phone]

## 2022-07-12 NOTE — PROGRESS NOTES
HPI  Jerrell Hernandez is a 22 year old female with type 1 diabetes mellitus.  Clinic visit for diabetes follow up today.  Pt gives a hx of type 1 diabetes mellitus dx at age 9.  She was told she has mild retinopathy at her last Oph exam 2 weeks ago.  She denies any known hx of nephropathy or neuropathy.  She was admitted in January 2022 and April 2022 with DKA. She had been drinking ETOH.  Only other medical hx includes hx of compartment syndrome and s/p appy.  For her diabetes, she is no longer using her Medtronic insulin pump. She last used her insulin pump in April 2022.   Pt is currently taking Lantus 30 units subcutaneous each am and Humalog 1 unit/7 gms CHO with meals plus correction.  Pt's A1C is 8.6 % today.  Previous A1C was 9.7 % in jan 2022.  I reviewed her DexcomG6 sensor today and her average glucose is 226 with SD 92 and estimated A1C 8.7 %.  Her blood sugar values are high postprandial.  She states she needs to work on her carb counting and take Humalog before eating.  On ROS today, she tells me she had a college friend pass away from DKA.  Fatigue.  Intermittent loose stools. Pt states she was tested for celiac in the past which was negative.  She denies blurred vision, n/v, SOB at rest, cough, fever or chills.  No chest pain.  She denies abd pain, dysuria or hematuria.  No sx of neuropathy or foot ulcers.    Diabetes Care  Retinopathy:mild retinopathy per patient; last Oph exam was 2 weeks ago and she has Oph follow up in 3 months.  Nephropathy:none; urine microalbuminuria negative today ( 7/2022).  Neuropathy:none.  Foot Exam:no ulcers and normal monofilamentous exam today.  Taking aspirin:no.  Lipids::LDL 79 in 3/2018.  Insulin : basal and meal time insulin with correction.  Testing: DexcomG6 sensor.  Hypoglycemia tx: pt has Baqsimi nasal glucagon spray to use in case of severe hypoglycemia.      ROS  See under HPI.    Allergies  Allergies   Allergen Reactions     Amoxicillin      Zithromax  [Azithromycin]        Medications  Current Outpatient Medications   Medication Sig Dispense Refill     acetone urine (KETOSTIX) test strip Patient to test when blood glucose is >300x2 or when sick and vomiting. Please schedule clinic visit 150 each 0     blood glucose monitoring (ARLINE CONTOUR NEXT) test strip Use to test blood sugar 13 times daily during dance season. 1200 each 4     blood glucose monitoring (ONETOUCH ULTRA) test strip Patient tests 8 times/day 720 each 3     Continuous Blood Gluc  (DEXCOM G6 ) RUSTAM Use to read blood sugars as per 's instructions. 1 each 0     Continuous Blood Gluc Sensor (DEXCOM G6 SENSOR) MISC Change every 10 days. 9 each 3     Continuous Blood Gluc Transmit (DEXCOM G6 TRANSMITTER) MISC Change every 3 months. 1 each 3     Glucagon (BAQSIMI ONE PACK) 3 MG/DOSE POWD Spray 3 mg in nostril See Admin Instructions USE ONLY FOR SEVERE HYPOGLYCEMIA. 1 each 3     HUMALOG KWIKPEN 100 UNIT/ML soln Inject 1 unit / 7 gms CHO with meals and snacks, plus correction. Pt uses approx 60 units in 24 hrs. 15 mL 3     insulin glargine (LANTUS SOLOSTAR) 100 UNIT/ML pen Inject 30 units subcutaneous daily. 15 mL 3     insulin pen needle (BD OPAL U/F) 32G X 4 MM miscellaneous Use 3-4 daily. 300 each 3     insulin syringes, disposable, U-100 0.3 ML MISC Use only for insulin injection in case insulin pump fails. 25 each 1     norgestimate-ethinyl estradiol (ORTHO-CYCLEN, SPRINTEC) 0.25-35 MG-MCG per tablet Take 1 tablet by mouth daily (Patient not taking: No sig reported) 84 tablet 3       Family History  family history includes Diabetes in an other family member; Thyroid Disease in her paternal grandmother.    Social History   reports that she has never smoked. She has never used smokeless tobacco. She reports current alcohol use. She reports that she does not use drugs.     Past Medical History  Past Medical History:   Diagnosis Date     Chronic generalized abdominal pain      "constipation     Type I (juvenile type) diabetes mellitus without mention of complication, uncontrolled 2009    previosuly followed at [facility]       Past Surgical History:   Procedure Laterality Date     ADENOIDECTOMY       APPENDECTOMY  2009     SINUS SURGERY         Physical Exam    /86 (BP Location: Left arm, Patient Position: Sitting, Cuff Size: Adult Regular)   Pulse 80   Temp 97.8  F (36.6  C) (Oral)   Ht 1.702 m (5' 7\")   Wt 78.9 kg (173 lb 14.4 oz)   SpO2 100%   BMI 27.24 kg/m      FEET: No ulcers; normal monofilamentous exam.    RESULTS  Creatinine   Date Value Ref Range Status   05/12/2019 0.68 0.52 - 1.04 mg/dL Final     GFR Estimate   Date Value Ref Range Status   05/12/2019 >90 >60 mL/min/[1.73_m2] Final     Comment:     Non  GFR Calc  Starting 12/18/2018, serum creatinine based estimated GFR (eGFR) will be   calculated using the Chronic Kidney Disease Epidemiology Collaboration   (CKD-EPI) equation.       Hemoglobin A1C POCT   Date Value Ref Range Status   12/15/2016 10.0 % Final     Potassium   Date Value Ref Range Status   05/12/2019 3.6 3.4 - 5.3 mmol/L Final     ALT   Date Value Ref Range Status   05/12/2019 19 0 - 50 U/L Final     AST   Date Value Ref Range Status   07/12/2022 10 0 - 45 U/L Final   05/12/2019 7 0 - 45 U/L Final     TSH   Date Value Ref Range Status   07/12/2022 3.74 0.40 - 4.00 mU/L Final   12/26/2019 1.29 0.40 - 4.00 mU/L Final       Cholesterol   Date Value Ref Range Status   03/30/2018 132 <170 mg/dL Final   12/04/2014 154 <170 mg/dL Final     Comment:     LDL Cholesterol is the primary guide to therapy.   The NCEP recommends further evaluation of: patients with cholesterol greater   than 200 mg/dL if additional risk factors are present, cholesterol greater   than   240 mg/dL, triglycerides greater than 150 mg/dL, or HDL less than 40 mg/dL.       HDL Cholesterol   Date Value Ref Range Status   03/30/2018 39 (L) >45 mg/dL Final     Comment:     Low: "             <40 mg/dl  Borderline low:   40-45 mg/dl     12/04/2014 33 (L) >45 mg/dL Final     LDL Cholesterol Calculated   Date Value Ref Range Status   03/30/2018 79 <110 mg/dL Final   12/04/2014 78 0 - 129 mg/dL Final     Comment:     LDL Cholesterol is the primary guide to therapy: LDL-cholesterol goal in high   risk patients is <100 mg/dL and in very high risk patients is <70 mg/dL.       Triglycerides   Date Value Ref Range Status   03/30/2018 70 <90 mg/dL Final   12/04/2014 217 (H) 0 - 150 mg/dL Final     Cholesterol/HDL Ratio   Date Value Ref Range Status   12/04/2014 4.7 0.0 - 5.0 Final   10/25/2012 4.6 0.0 - 5.0 Final         ASSESSMENT/PLAN:    1.  TYPE 1 DIABETES MELLITUS: No change in insulin pump settings today.   Jerrell is to focus on her carb counting and take her insulin before eating.   She was instructed to send me a message in 3-4 weeks to review her DexcomG6 sensor data.  Pt denies sx of neuropathy. No foot ulcers on exam today.  Her urine microalbuminuria was negative today ( 7/2022).  /86 today.    2.  RETINOPATHY: Pt seen by Oph 2 weeks ago with mild retinopathy per patient.  She has follow up with Oph in 3 months.    3.  HX OF INTERMITTENT LOOSE STOOLS: Labs for celiac today.    4.  MENTAL STATUS: She denies feeling depressed at this time. Some anxiety.    5.  FOLLOW UP: with me in 3 months.  Labs today.    Time spent reviewing chart, labs and DexcomG6 sensor download data today = 8 minutes.  Time for clinic visit today  = 30 minutes.  Time for documentation today = 15 minutes.    TOTAL TIME FOR VISIT TODAY = 53 minutes.    Corina Rosario PA-C

## 2022-07-12 NOTE — TELEPHONE ENCOUNTER
----- Message from Corina Rosario PA-C sent at 7/12/2022  1:14 PM CDT -----  Hi:  Jerrell has some questions about the when she may be able to get a [employer] insulin pump.  Can one of you call her within the next few weeks?  Vic Marx

## 2022-07-12 NOTE — LETTER
7/12/2022       RE: Jerrell Hernandez  [address] [place] [address]     Dear Colleague,    Thank you for referring your patient, Jerrell Hernandez, to the [facility] ENDOCRINOLOGY [facility]IC [place] at [facility]. Please see a copy of my visit note below.    HPI  Jerrell Hernandez is a 22 year old female with type 1 diabetes mellitus.  Clinic visit for diabetes follow up today.  Pt gives a hx of type 1 diabetes mellitus dx at age 9.  She was told she has mild retinopathy at her last Oph exam 2 weeks ago.  She denies any known hx of nephropathy or neuropathy.  She was admitted in January 2022 and April 2022 with DKA. She had been drinking ETOH.  Only other medical hx includes hx of compartment syndrome and s/p appy.  For her diabetes, she is no longer using her Medtronic insulin pump. She last used her insulin pump in April 2022.   Pt is currently taking Lantus 30 units subcutaneous each am and Humalog 1 unit/7 gms CHO with meals plus correction.  Pt's A1C is 8.6 % today.  Previous A1C was 9.7 % in jan 2022.  I reviewed her DexcomG6 sensor today and her average glucose is 226 with SD 92 and estimated A1C 8.7 %.  Her blood sugar values are high postprandial.  She states she needs to work on her carb counting and take Humalog before eating.  On ROS today, she tells me she had a college friend pass away from DKA.  Fatigue.  Intermittent loose stools. Pt states she was tested for celiac in the past which was negative.  She denies blurred vision, n/v, SOB at rest, cough, fever or chills.  No chest pain.  She denies abd pain, dysuria or hematuria.  No sx of neuropathy or foot ulcers.    Diabetes Care  Retinopathy:mild retinopathy per patient; last Oph exam was 2 weeks ago and she has Oph follow up in 3 months.  Nephropathy:none; urine microalbuminuria negative today ( 7/2022).  Neuropathy:none.  Foot Exam:no ulcers and normal monofilamentous exam today.  Taking  aspirin:no.  Lipids::LDL 79 in 3/2018.  Insulin : basal and meal time insulin with correction.  Testing: DexcomG6 sensor.  Hypoglycemia tx: pt has Baqsimi nasal glucagon spray to use in case of severe hypoglycemia.      ROS  See under HPI.    Allergies  Allergies   Allergen Reactions     Amoxicillin      Zithromax [Azithromycin]        Medications  Current Outpatient Medications   Medication Sig Dispense Refill     acetone urine (KETOSTIX) test strip Patient to test when blood glucose is >300x2 or when sick and vomiting. Please schedule clinic visit 150 each 0     blood glucose monitoring (ARLINE CONTOUR NEXT) test strip Use to test blood sugar 13 times daily during dance season. 1200 each 4     blood glucose monitoring (ONETOUCH ULTRA) test strip Patient tests 8 times/day 720 each 3     Continuous Blood Gluc  (DEXCOM G6 ) RUSTAM Use to read blood sugars as per 's instructions. 1 each 0     Continuous Blood Gluc Sensor (DEXCOM G6 SENSOR) MISC Change every 10 days. 9 each 3     Continuous Blood Gluc Transmit (DEXCOM G6 TRANSMITTER) MISC Change every 3 months. 1 each 3     Glucagon (BAQSIMI ONE PACK) 3 MG/DOSE POWD Spray 3 mg in nostril See Admin Instructions USE ONLY FOR SEVERE HYPOGLYCEMIA. 1 each 3     HUMALOG KWIKPEN 100 UNIT/ML soln Inject 1 unit / 7 gms CHO with meals and snacks, plus correction. Pt uses approx 60 units in 24 hrs. 15 mL 3     insulin glargine (LANTUS SOLOSTAR) 100 UNIT/ML pen Inject 30 units subcutaneous daily. 15 mL 3     insulin pen needle (BD OPAL U/F) 32G X 4 MM miscellaneous Use 3-4 daily. 300 each 3     insulin syringes, disposable, U-100 0.3 ML MISC Use only for insulin injection in case insulin pump fails. 25 each 1     norgestimate-ethinyl estradiol (ORTHO-CYCLEN, SPRINTEC) 0.25-35 MG-MCG per tablet Take 1 tablet by mouth daily (Patient not taking: No sig reported) 84 tablet 3       Family History  family history includes Diabetes in an other family member;  "Thyroid Disease in her paternal grandmother.    Social History   reports that she has never smoked. She has never used smokeless tobacco. She reports current alcohol use. She reports that she does not use drugs.     Past Medical History  Past Medical History:   Diagnosis Date     Chronic generalized abdominal pain     constipation     Type I (juvenile type) diabetes mellitus without mention of complication, uncontrolled 2009    previosuly followed at [facility]       Past Surgical History:   Procedure Laterality Date     ADENOIDECTOMY       APPENDECTOMY  2009     SINUS SURGERY         Physical Exam    /86 (BP Location: Left arm, Patient Position: Sitting, Cuff Size: Adult Regular)   Pulse 80   Temp 97.8  F (36.6  C) (Oral)   Ht 1.702 m (5' 7\")   Wt 78.9 kg (173 lb 14.4 oz)   SpO2 100%   BMI 27.24 kg/m      FEET: No ulcers; normal monofilamentous exam.    RESULTS  Creatinine   Date Value Ref Range Status   05/12/2019 0.68 0.52 - 1.04 mg/dL Final     GFR Estimate   Date Value Ref Range Status   05/12/2019 >90 >60 mL/min/[1.73_m2] Final     Comment:     Non  GFR Calc  Starting 12/18/2018, serum creatinine based estimated GFR (eGFR) will be   calculated using the Chronic Kidney Disease Epidemiology Collaboration   (CKD-EPI) equation.       Hemoglobin A1C POCT   Date Value Ref Range Status   12/15/2016 10.0 % Final     Potassium   Date Value Ref Range Status   05/12/2019 3.6 3.4 - 5.3 mmol/L Final     ALT   Date Value Ref Range Status   05/12/2019 19 0 - 50 U/L Final     AST   Date Value Ref Range Status   07/12/2022 10 0 - 45 U/L Final   05/12/2019 7 0 - 45 U/L Final     TSH   Date Value Ref Range Status   07/12/2022 3.74 0.40 - 4.00 mU/L Final   12/26/2019 1.29 0.40 - 4.00 mU/L Final       Cholesterol   Date Value Ref Range Status   03/30/2018 132 <170 mg/dL Final   12/04/2014 154 <170 mg/dL Final     Comment:     LDL Cholesterol is the primary guide to therapy.   The NCEP recommends further " evaluation of: patients with cholesterol greater   than 200 mg/dL if additional risk factors are present, cholesterol greater   than   240 mg/dL, triglycerides greater than 150 mg/dL, or HDL less than 40 mg/dL.       HDL Cholesterol   Date Value Ref Range Status   03/30/2018 39 (L) >45 mg/dL Final     Comment:     Low:             <40 mg/dl  Borderline low:   40-45 mg/dl     12/04/2014 33 (L) >45 mg/dL Final     LDL Cholesterol Calculated   Date Value Ref Range Status   03/30/2018 79 <110 mg/dL Final   12/04/2014 78 0 - 129 mg/dL Final     Comment:     LDL Cholesterol is the primary guide to therapy: LDL-cholesterol goal in high   risk patients is <100 mg/dL and in very high risk patients is <70 mg/dL.       Triglycerides   Date Value Ref Range Status   03/30/2018 70 <90 mg/dL Final   12/04/2014 217 (H) 0 - 150 mg/dL Final     Cholesterol/HDL Ratio   Date Value Ref Range Status   12/04/2014 4.7 0.0 - 5.0 Final   10/25/2012 4.6 0.0 - 5.0 Final         ASSESSMENT/PLAN:    1.  TYPE 1 DIABETES MELLITUS: No change in insulin pump settings today.   Jerrell is to focus on her carb counting and take her insulin before eating.   She was instructed to send me a message in 3-4 weeks to review her DexcomG6 sensor data.  Pt denies sx of neuropathy. No foot ulcers on exam today.  Her urine microalbuminuria was negative today ( 7/2022).  /86 today.    2.  RETINOPATHY: Pt seen by Oph 2 weeks ago with mild retinopathy per patient.  She has follow up with Oph in 3 months.    3.  HX OF INTERMITTENT LOOSE STOOLS: Labs for celiac today.    4.  MENTAL STATUS: She denies feeling depressed at this time. Some anxiety.    5.  FOLLOW UP: with me in 3 months.  Labs today.    Time spent reviewing chart, labs and DexcomG6 sensor download data today = 8 minutes.  Time for clinic visit today  = 30 minutes.  Time for documentation today = 15 minutes.    TOTAL TIME FOR VISIT TODAY = 53 minutes.    Corina Rosario PA-C

## 2022-07-13 LAB
TTG IGA SER-ACNC: 0.2 U/ML
TTG IGA SER-ACNC: 0.2 U/ML
TTG IGG SER-ACNC: 1 U/ML

## 2022-07-13 NOTE — TELEPHONE ENCOUNTER
[employer] message sent to pt.    Felciity SHAFERN, RN, [facility]  Certified Diabetes Care and   [facility] Endocrinology and Diabetes  [facility]  [address] [place] [address]  Phone [phone]

## 2022-08-02 DIAGNOSIS — E10.65 UNCONTROLLED TYPE 1 DIABETES MELLITUS WITH HYPERGLYCEMIA (H): ICD-10-CM

## 2022-08-03 RX ORDER — INSULIN GLARGINE 100 [IU]/ML
INJECTION, SOLUTION SUBCUTANEOUS
Refills: 1 | OUTPATIENT
Start: 2022-08-03

## 2022-09-11 DIAGNOSIS — E10.65 UNCONTROLLED TYPE 1 DIABETES MELLITUS WITH HYPERGLYCEMIA (H): ICD-10-CM

## 2022-09-12 RX ORDER — INSULIN GLARGINE 100 [IU]/ML
INJECTION, SOLUTION SUBCUTANEOUS
Qty: 30 ML | Refills: 0 | Status: SHIPPED | OUTPATIENT
Start: 2022-09-12 | End: 2022-10-12

## 2022-09-12 NOTE — TELEPHONE ENCOUNTER
Long Acting Insulin Protocol Failed 09/12/2022 10:09 AM   Protocol Details  Serum creatinine on file in past 12 months

## 2022-09-12 NOTE — TELEPHONE ENCOUNTER
LANTUS SOLOSTAR 100 UNIT/ML      Last Written Prescription Date:  7-12-22  Last Fill Quantity: 15 ml,   # refills: 3  Last Office Visit : 7-12-22  Future Office visit:  10-13-22    Routing refill request to provider for review/approval because:  Insulin - refilled per clinic

## 2022-09-19 ENCOUNTER — TRANSFERRED RECORDS (OUTPATIENT)
Dept: HEALTH INFORMATION MANAGEMENT | Facility: CLINIC | Age: 22
End: 2022-09-19

## 2022-09-19 LAB — RETINOPATHY: NEGATIVE

## 2022-10-05 DIAGNOSIS — E10.65 UNCONTROLLED TYPE 1 DIABETES MELLITUS WITH HYPERGLYCEMIA (H): ICD-10-CM

## 2022-10-05 RX ORDER — INSULIN LISPRO 100 [IU]/ML
INJECTION, SOLUTION INTRAVENOUS; SUBCUTANEOUS
Qty: 60 ML | Refills: 3 | Status: SHIPPED | OUTPATIENT
Start: 2022-10-05 | End: 2023-02-21

## 2022-10-05 NOTE — TELEPHONE ENCOUNTER
HUMALOG 100 UNIT/ML KWIKPEN  Last Written Prescription Date:   7/12/2022  Last Fill Quantity: 15,   # refills: 3  Last Office Visit :  7/12/2022  Future Office visit:   10/13/2022    Routing refill request to provider for review/approval because:  Drug not on the [facility], [facility] or [facility] refill protocol or controlled substance      Nayely Bob RN  Central Triage Red Flags/Med Refills

## 2022-10-11 DIAGNOSIS — E10.65 UNCONTROLLED TYPE 1 DIABETES MELLITUS WITH HYPERGLYCEMIA (H): ICD-10-CM

## 2022-10-12 RX ORDER — INSULIN GLARGINE 100 [IU]/ML
INJECTION, SOLUTION SUBCUTANEOUS
Qty: 30 ML | Refills: 0 | Status: SHIPPED | OUTPATIENT
Start: 2022-10-12 | End: 2022-11-22

## 2022-10-12 NOTE — TELEPHONE ENCOUNTER
LANTUS SOLOSTAR 100 UNIT/ML      Last Written Prescription Date:  9-12-22  Last Fill Quantity: 30 ml,   # refills: 0  Last Office Visit : 7-12-22  Future Office visit:  10-13-22    Routing refill request to provider for review/approval because:  Insulin - refilled per clinic

## 2022-10-12 NOTE — TELEPHONE ENCOUNTER
Long Acting Insulin Protocol Failed 10/12/2022 06:52 AM   Protocol Details  Serum creatinine on file in past 12 months    HgbA1C in past 3 or 6 months

## 2022-11-20 ENCOUNTER — HEALTH MAINTENANCE LETTER (OUTPATIENT)
Age: 22
End: 2022-11-20

## 2022-11-22 ENCOUNTER — LAB (OUTPATIENT)
Dept: LAB | Facility: CLINIC | Age: 22
End: 2022-11-22
Payer: COMMERCIAL

## 2022-11-22 ENCOUNTER — OFFICE VISIT (OUTPATIENT)
Dept: ENDOCRINOLOGY | Facility: CLINIC | Age: 22
End: 2022-11-22
Payer: COMMERCIAL

## 2022-11-22 VITALS
DIASTOLIC BLOOD PRESSURE: 83 MMHG | OXYGEN SATURATION: 95 % | BODY MASS INDEX: 27.58 KG/M2 | WEIGHT: 176.1 LBS | HEART RATE: 89 BPM | SYSTOLIC BLOOD PRESSURE: 119 MMHG

## 2022-11-22 DIAGNOSIS — E10.65 UNCONTROLLED TYPE 1 DIABETES MELLITUS WITH HYPERGLYCEMIA (H): ICD-10-CM

## 2022-11-22 DIAGNOSIS — E10.65 UNCONTROLLED TYPE 1 DIABETES MELLITUS WITH HYPERGLYCEMIA (H): Primary | ICD-10-CM

## 2022-11-22 LAB
ALBUMIN SERPL BCG-MCNC: 4.6 G/DL (ref 3.5–5.2)
ALP SERPL-CCNC: 89 U/L (ref 35–104)
ALT SERPL W P-5'-P-CCNC: 10 U/L (ref 10–35)
ANION GAP SERPL CALCULATED.3IONS-SCNC: 12 MMOL/L (ref 7–15)
AST SERPL W P-5'-P-CCNC: 13 U/L (ref 10–35)
BILIRUB SERPL-MCNC: 1 MG/DL
BUN SERPL-MCNC: 13.2 MG/DL (ref 6–20)
CALCIUM SERPL-MCNC: 9.3 MG/DL (ref 8.6–10)
CHLORIDE SERPL-SCNC: 99 MMOL/L (ref 98–107)
CREAT SERPL-MCNC: 0.7 MG/DL (ref 0.51–0.95)
DEPRECATED HCO3 PLAS-SCNC: 26 MMOL/L (ref 22–29)
GFR SERPL CREATININE-BSD FRML MDRD: >90 ML/MIN/1.73M2
GLUCOSE SERPL-MCNC: 262 MG/DL (ref 70–99)
HBA1C MFR BLD: 7.9 %
HBA1C MFR BLD: 8.1 % (ref 4.3–?)
POTASSIUM SERPL-SCNC: 4.6 MMOL/L (ref 3.4–5.3)
PROT SERPL-MCNC: 7.2 G/DL (ref 6.4–8.3)
SODIUM SERPL-SCNC: 137 MMOL/L (ref 136–145)
TSH SERPL DL<=0.005 MIU/L-ACNC: 3.05 UIU/ML (ref 0.3–4.2)

## 2022-11-22 PROCEDURE — 80053 COMPREHEN METABOLIC PANEL: CPT | Performed by: PATHOLOGY

## 2022-11-22 PROCEDURE — 99215 OFFICE O/P EST HI 40 MIN: CPT | Performed by: PHYSICIAN ASSISTANT

## 2022-11-22 PROCEDURE — 83036 HEMOGLOBIN GLYCOSYLATED A1C: CPT | Performed by: PHYSICIAN ASSISTANT

## 2022-11-22 PROCEDURE — 36415 COLL VENOUS BLD VENIPUNCTURE: CPT | Performed by: PATHOLOGY

## 2022-11-22 PROCEDURE — 84443 ASSAY THYROID STIM HORMONE: CPT | Performed by: PATHOLOGY

## 2022-11-22 RX ORDER — INSULIN GLARGINE 100 [IU]/ML
INJECTION, SOLUTION SUBCUTANEOUS
Qty: 30 ML | Refills: 3 | Status: SHIPPED | OUTPATIENT
Start: 2022-11-22 | End: 2023-02-21

## 2022-11-22 ASSESSMENT — PAIN SCALES - GENERAL: PAINLEVEL: NO PAIN (0)

## 2022-11-22 NOTE — LETTER
11/22/2022       RE: Jerrell Hernandez  [address] [place] [address]     Dear Colleague,    Thank you for referring your patient, Jerrell Hernandez, to the [facility] ENDOCRINOLOGY [facility]INIC [place] at [facility]. Please see a copy of my visit note below.    HPI  Jerrell Hernandez is a 22 year old female with type 1 diabetes mellitus.  Clinic visit for diabetes follow up today.  Pt last seen in July 2022.  Pt gives a hx of type 1 diabetes mellitus dx at age 9.  She was told she has mild retinopathy and is seen by her Oph staff every 3 months.  She denies any known hx of nephropathy or neuropathy.  She was admitted in January 2022 and April 2022 with DKA. She had been drinking ETOH.  Only other medical hx includes hx of compartment syndrome and s/p appy.  She had COVID for second time 1 month ago.  For her diabetes, she is no longer using her Medtronic insulin pump. She last used her insulin pump in April 2022.   Pt is currently taking Lantus 30 units subcutaneous each am and Humalog 1 unit/7 gms CHO with meals plus correction.  Pt's A1C is 8.1 % today.  Previous A1C was 8.6 %.  I reviewed her DexcomG6 sensor today and her average glucose is 262 with SD 94 and estimated A1C 9.6%.  Her blood sugars are high overnight.  On ROS today, she tells me she had a college friend pass away from DKA in 2020.  Fatigue and some SOB since COVID. No fevers or chills at this time. Some chestwall tenderness since COVID.  Hx of migraine headaches and nausea with headaches.  She denies blurred vision, abd pain, dysuria or hematuria.  Loose stools when she is anxious. She has tested negative for celiac in the past.  No sx of neuropathy or foot ulcers.  LMP was 3 weeks ago.    Diabetes Care  Retinopathy:mild retinopathy per patient; she has Oph follow up in 3 months.  Nephropathy:none; urine microalbuminuria negative in July 2022.  Neuropathy:none.  Foot Exam:no ulcers and  normal monofilamentous exam today.  Taking aspirin:no.  Lipids::LDL 79 in 3/2018.  Insulin : basal and meal time insulin with correction.  Testing: DexcomG6 sensor.  Hypoglycemia tx: pt has Baqsimi nasal glucagon spray to use in case of severe hypoglycemia.      ROS  See under HPI.    Allergies  Allergies   Allergen Reactions     Amoxicillin      Zithromax [Azithromycin]        Medications  Current Outpatient Medications   Medication Sig Dispense Refill     acetone urine (KETOSTIX) test strip Patient to test when blood glucose is >300x2 or when sick and vomiting. Please schedule clinic visit 150 each 0     blood glucose monitoring (ARLINE CONTOUR NEXT) test strip Use to test blood sugar 13 times daily during dance season. 1200 each 4     Continuous Blood Gluc  (DEXCOM G6 ) RUSTAM Use to read blood sugars as per 's instructions. 1 each 0     Continuous Blood Gluc Sensor (DEXCOM G6 SENSOR) MISC Change every 10 days. 9 each 3     Continuous Blood Gluc Transmit (DEXCOM G6 TRANSMITTER) MISC Change every 3 months. 1 each 3     Glucagon (BAQSIMI ONE PACK) 3 MG/DOSE POWD Spray 3 mg in nostril See Admin Instructions USE ONLY FOR SEVERE HYPOGLYCEMIA. 1 each 3     HUMALOG KWIKPEN 100 UNIT/ML soln INJECT 1 UNIT / 7 GMS CHO WITH MEALS AND SNACKS, PLUS CORRECTION. PT USES APPROX 60 UNITS IN 24 HRS. 60 mL 3     insulin glargine (LANTUS SOLOSTAR) 100 UNIT/ML pen Inject 30 units subcutaneous daily. In case of pump failure ONLY. 30 mL 0     insulin pen needle (BD OPAL U/F) 32G X 4 MM miscellaneous Use 3-4 daily. 300 each 3     insulin syringes, disposable, U-100 0.3 ML MISC Use only for insulin injection in case insulin pump fails. 25 each 1     blood glucose monitoring (ONETOUCH ULTRA) test strip Patient tests 8 times/day (Patient not taking: Reported on 11/22/2022) 720 each 3     norgestimate-ethinyl estradiol (ORTHO-CYCLEN, SPRINTEC) 0.25-35 MG-MCG per tablet Take 1 tablet by mouth daily (Patient not  taking: Reported on 11/18/2021) 84 tablet 3       Family History  family history includes Diabetes in an other family member; Thyroid Disease in her paternal grandmother.    Social History   reports that she has never smoked. She has never used smokeless tobacco. She reports current alcohol use. She reports that she does not use drugs.     Past Medical History  Past Medical History:   Diagnosis Date     Chronic generalized abdominal pain     constipation     Type I (juvenile type) diabetes mellitus without mention of complication, uncontrolled 2009    previosuly followed at [facility]       Past Surgical History:   Procedure Laterality Date     ADENOIDECTOMY       APPENDECTOMY  2009     SINUS SURGERY         Physical Exam    /83   Pulse 89   Wt 79.9 kg (176 lb 1.6 oz)   SpO2 95%   BMI 27.58 kg/m      FEET: No ulcers; normal monofilamentous exam.    RESULTS  Creatinine   Date Value Ref Range Status   05/12/2019 0.68 0.52 - 1.04 mg/dL Final     GFR Estimate   Date Value Ref Range Status   05/12/2019 >90 >60 mL/min/[1.73_m2] Final     Comment:     Non  GFR Calc  Starting 12/18/2018, serum creatinine based estimated GFR (eGFR) will be   calculated using the Chronic Kidney Disease Epidemiology Collaboration   (CKD-EPI) equation.       Hemoglobin A1C   Date Value Ref Range Status   12/15/2016 10.0 % Final     Potassium   Date Value Ref Range Status   05/12/2019 3.6 3.4 - 5.3 mmol/L Final     ALT   Date Value Ref Range Status   05/12/2019 19 0 - 50 U/L Final     AST   Date Value Ref Range Status   07/12/2022 10 0 - 45 U/L Final   05/12/2019 7 0 - 45 U/L Final     TSH   Date Value Ref Range Status   07/12/2022 3.74 0.40 - 4.00 mU/L Final   12/26/2019 1.29 0.40 - 4.00 mU/L Final       Cholesterol   Date Value Ref Range Status   03/30/2018 132 <170 mg/dL Final   12/04/2014 154 <170 mg/dL Final     Comment:     LDL Cholesterol is the primary guide to therapy.   The NCEP recommends further evaluation  of: patients with cholesterol greater   than 200 mg/dL if additional risk factors are present, cholesterol greater   than   240 mg/dL, triglycerides greater than 150 mg/dL, or HDL less than 40 mg/dL.       HDL Cholesterol   Date Value Ref Range Status   03/30/2018 39 (L) >45 mg/dL Final     Comment:     Low:             <40 mg/dl  Borderline low:   40-45 mg/dl     12/04/2014 33 (L) >45 mg/dL Final     LDL Cholesterol Calculated   Date Value Ref Range Status   03/30/2018 79 <110 mg/dL Final   12/04/2014 78 0 - 129 mg/dL Final     Comment:     LDL Cholesterol is the primary guide to therapy: LDL-cholesterol goal in high   risk patients is <100 mg/dL and in very high risk patients is <70 mg/dL.       Triglycerides   Date Value Ref Range Status   03/30/2018 70 <90 mg/dL Final   12/04/2014 217 (H) 0 - 150 mg/dL Final     Cholesterol/HDL Ratio   Date Value Ref Range Status   12/04/2014 4.7 0.0 - 5.0 Final   10/25/2012 4.6 0.0 - 5.0 Final         ASSESSMENT/PLAN:    1.  TYPE 1 DIABETES MELLITUS: Increase Lantus 32 units subcutaneous daily and titrate dose up by 2 units to 34 units daily in 3 days if FBS remains > 130.  If her 2 hr postprandial blood sugars are consistently > 180, she is to change her Humalog I/C ratio to 1:5 ( was 1:7 ).  Pt is interested in the Tandem insulin pump-control IQ and will reach out to the CDE to further discuss.  Pt denies sx of neuropathy. No foot ulcers on exam today.  Her urine microalbuminuria was negative in 7/2022.  /83 today.    2.  RETINOPATHY: Pt seen by Oph every 3 months.  No new visual changes.    3.  HX OF INTERMITTENT LOOSE STOOLS: Labs for celiac negative. This occurs with increase anxiety.    4.  MENTAL STATUS: She denies feeling depressed at this time. Some anxiety.  She is seeing a therapist.    5.  FOLLOW UP: with me in clinic in Feb 2023.  TSH ordered today.    Time spent reviewing chart, labs and DexcomG6 sensor download data today = 6 minutes.  Time for clinic visit  today  = 30 minutes.  Time for documentation today = 15 minutes.    TOTAL TIME FOR VISIT TODAY = 51  minutes.    Corina Rosario PA-C

## 2022-11-22 NOTE — PROGRESS NOTES
HPI  Jerrell Hernandez is a 22 year old female with type 1 diabetes mellitus.  Clinic visit for diabetes follow up today.  Pt last seen in July 2022.  Pt gives a hx of type 1 diabetes mellitus dx at age 9.  She was told she has mild retinopathy and is seen by her Oph staff every 3 months.  She denies any known hx of nephropathy or neuropathy.  She was admitted in January 2022 and April 2022 with DKA. She had been drinking ETOH.  Only other medical hx includes hx of compartment syndrome and s/p appy.  She had COVID for second time 1 month ago.  For her diabetes, she is no longer using her Medtronic insulin pump. She last used her insulin pump in April 2022.   Pt is currently taking Lantus 30 units subcutaneous each am and Humalog 1 unit/7 gms CHO with meals plus correction.  Pt's A1C is 8.1 % today.  Previous A1C was 8.6 %.  I reviewed her DexcomG6 sensor today and her average glucose is 262 with SD 94 and estimated A1C 9.6%.  Her blood sugars are high overnight.  On ROS today, she tells me she had a college friend pass away from DKA in 2020.  Fatigue and some SOB since COVID. No fevers or chills at this time. Some chestwall tenderness since COVID.  Hx of migraine headaches and nausea with headaches.  She denies blurred vision, abd pain, dysuria or hematuria.  Loose stools when she is anxious. She has tested negative for celiac in the past.  No sx of neuropathy or foot ulcers.  LMP was 3 weeks ago.    Diabetes Care  Retinopathy:mild retinopathy per patient; she has Oph follow up in 3 months.  Nephropathy:none; urine microalbuminuria negative in July 2022.  Neuropathy:none.  Foot Exam:no ulcers and normal monofilamentous exam today.  Taking aspirin:no.  Lipids::LDL 79 in 3/2018.  Insulin : basal and meal time insulin with correction.  Testing: DexcomG6 sensor.  Hypoglycemia tx: pt has Baqsimi nasal glucagon spray to use in case of severe hypoglycemia.      ROS  See under HPI.    Allergies  Allergies   Allergen Reactions      Amoxicillin      Zithromax [Azithromycin]        Medications  Current Outpatient Medications   Medication Sig Dispense Refill     acetone urine (KETOSTIX) test strip Patient to test when blood glucose is >300x2 or when sick and vomiting. Please schedule clinic visit 150 each 0     blood glucose monitoring (ARLINE CONTOUR NEXT) test strip Use to test blood sugar 13 times daily during dance season. 1200 each 4     Continuous Blood Gluc  (DEXCOM G6 ) RUSTAM Use to read blood sugars as per 's instructions. 1 each 0     Continuous Blood Gluc Sensor (DEXCOM G6 SENSOR) MISC Change every 10 days. 9 each 3     Continuous Blood Gluc Transmit (DEXCOM G6 TRANSMITTER) MISC Change every 3 months. 1 each 3     Glucagon (BAQSIMI ONE PACK) 3 MG/DOSE POWD Spray 3 mg in nostril See Admin Instructions USE ONLY FOR SEVERE HYPOGLYCEMIA. 1 each 3     HUMALOG KWIKPEN 100 UNIT/ML soln INJECT 1 UNIT / 7 GMS CHO WITH MEALS AND SNACKS, PLUS CORRECTION. PT USES APPROX 60 UNITS IN 24 HRS. 60 mL 3     insulin glargine (LANTUS SOLOSTAR) 100 UNIT/ML pen Inject 30 units subcutaneous daily. In case of pump failure ONLY. 30 mL 0     insulin pen needle (BD OPAL U/F) 32G X 4 MM miscellaneous Use 3-4 daily. 300 each 3     insulin syringes, disposable, U-100 0.3 ML MISC Use only for insulin injection in case insulin pump fails. 25 each 1     blood glucose monitoring (ONETOUCH ULTRA) test strip Patient tests 8 times/day (Patient not taking: Reported on 11/22/2022) 720 each 3     norgestimate-ethinyl estradiol (ORTHO-CYCLEN, SPRINTEC) 0.25-35 MG-MCG per tablet Take 1 tablet by mouth daily (Patient not taking: Reported on 11/18/2021) 84 tablet 3       Family History  family history includes Diabetes in an other family member; Thyroid Disease in her paternal grandmother.    Social History   reports that she has never smoked. She has never used smokeless tobacco. She reports current alcohol use. She reports that she does not use  drugs.     Past Medical History  Past Medical History:   Diagnosis Date     Chronic generalized abdominal pain     constipation     Type I (juvenile type) diabetes mellitus without mention of complication, uncontrolled 2009    previosuly followed at [facility]       Past Surgical History:   Procedure Laterality Date     ADENOIDECTOMY       APPENDECTOMY  2009     SINUS SURGERY         Physical Exam    /83   Pulse 89   Wt 79.9 kg (176 lb 1.6 oz)   SpO2 95%   BMI 27.58 kg/m      FEET: No ulcers; normal monofilamentous exam.    RESULTS  Creatinine   Date Value Ref Range Status   05/12/2019 0.68 0.52 - 1.04 mg/dL Final     GFR Estimate   Date Value Ref Range Status   05/12/2019 >90 >60 mL/min/[1.73_m2] Final     Comment:     Non  GFR Calc  Starting 12/18/2018, serum creatinine based estimated GFR (eGFR) will be   calculated using the Chronic Kidney Disease Epidemiology Collaboration   (CKD-EPI) equation.       Hemoglobin A1C   Date Value Ref Range Status   12/15/2016 10.0 % Final     Potassium   Date Value Ref Range Status   05/12/2019 3.6 3.4 - 5.3 mmol/L Final     ALT   Date Value Ref Range Status   05/12/2019 19 0 - 50 U/L Final     AST   Date Value Ref Range Status   07/12/2022 10 0 - 45 U/L Final   05/12/2019 7 0 - 45 U/L Final     TSH   Date Value Ref Range Status   07/12/2022 3.74 0.40 - 4.00 mU/L Final   12/26/2019 1.29 0.40 - 4.00 mU/L Final       Cholesterol   Date Value Ref Range Status   03/30/2018 132 <170 mg/dL Final   12/04/2014 154 <170 mg/dL Final     Comment:     LDL Cholesterol is the primary guide to therapy.   The NCEP recommends further evaluation of: patients with cholesterol greater   than 200 mg/dL if additional risk factors are present, cholesterol greater   than   240 mg/dL, triglycerides greater than 150 mg/dL, or HDL less than 40 mg/dL.       HDL Cholesterol   Date Value Ref Range Status   03/30/2018 39 (L) >45 mg/dL Final     Comment:     Low:             <40  mg/dl  Borderline low:   40-45 mg/dl     12/04/2014 33 (L) >45 mg/dL Final     LDL Cholesterol Calculated   Date Value Ref Range Status   03/30/2018 79 <110 mg/dL Final   12/04/2014 78 0 - 129 mg/dL Final     Comment:     LDL Cholesterol is the primary guide to therapy: LDL-cholesterol goal in high   risk patients is <100 mg/dL and in very high risk patients is <70 mg/dL.       Triglycerides   Date Value Ref Range Status   03/30/2018 70 <90 mg/dL Final   12/04/2014 217 (H) 0 - 150 mg/dL Final     Cholesterol/HDL Ratio   Date Value Ref Range Status   12/04/2014 4.7 0.0 - 5.0 Final   10/25/2012 4.6 0.0 - 5.0 Final         ASSESSMENT/PLAN:    1.  TYPE 1 DIABETES MELLITUS: Increase Lantus 32 units subcutaneous daily and titrate dose up by 2 units to 34 units daily in 3 days if FBS remains > 130.  If her 2 hr postprandial blood sugars are consistently > 180, she is to change her Humalog I/C ratio to 1:5 ( was 1:7 ).  Pt is interested in the Tandem insulin pump-control IQ and will reach out to the CDE to further discuss.  Pt denies sx of neuropathy. No foot ulcers on exam today.  Her urine microalbuminuria was negative in 7/2022.  /83 today.    2.  RETINOPATHY: Pt seen by Oph every 3 months.  No new visual changes.    3.  HX OF INTERMITTENT LOOSE STOOLS: Labs for celiac negative. This occurs with increase anxiety.    4.  MENTAL STATUS: She denies feeling depressed at this time. Some anxiety.  She is seeing a therapist.    5.  FOLLOW UP: with me in clinic in Feb 2023.  TSH ordered today.    Time spent reviewing chart, labs and DexcomG6 sensor download data today = 6 minutes.  Time for clinic visit today  = 30 minutes.  Time for documentation today = 15 minutes.    TOTAL TIME FOR VISIT TODAY = 51  minutes.    Corina Rosario PA-C

## 2022-12-08 ENCOUNTER — TELEPHONE (OUTPATIENT)
Dept: ENDOCRINOLOGY | Facility: CLINIC | Age: 22
End: 2022-12-08

## 2022-12-08 NOTE — TELEPHONE ENCOUNTER
Prior Authorization Approval    Authorization Effective Date: 12/8/2022  Authorization Expiration Date: 12/8/2023  Medication: Dexcom  Approved Dose/Quantity:   Reference #: Key: [id number]   Insurance Company: [employer] ([facility]) - Phone [phone] Fax [phone]  Expected CoPay:       CoPay Card Available:      Foundation Assistance Needed:    Which Pharmacy is filling the prescription (Not needed for infusion/clinic administered):    Pharmacy Notified:    Patient Notified:

## 2023-01-05 ENCOUNTER — MYC REFILL (OUTPATIENT)
Dept: ENDOCRINOLOGY | Facility: CLINIC | Age: 23
End: 2023-01-05

## 2023-01-05 DIAGNOSIS — E10.65 UNCONTROLLED TYPE 1 DIABETES MELLITUS WITH HYPERGLYCEMIA (H): ICD-10-CM

## 2023-01-05 RX ORDER — PROCHLORPERAZINE 25 MG/1
SUPPOSITORY RECTAL
Qty: 1 EACH | Refills: 0 | Status: SHIPPED | OUTPATIENT
Start: 2023-01-05

## 2023-01-05 RX ORDER — PROCHLORPERAZINE 25 MG/1
SUPPOSITORY RECTAL
Qty: 9 EACH | Refills: 3 | Status: SHIPPED | OUTPATIENT
Start: 2023-01-05 | End: 2023-01-06

## 2023-01-06 ENCOUNTER — TELEPHONE (OUTPATIENT)
Dept: ENDOCRINOLOGY | Facility: CLINIC | Age: 23
End: 2023-01-06

## 2023-01-06 DIAGNOSIS — E10.65 TYPE 1 DIABETES MELLITUS WITH HYPERGLYCEMIA (H): ICD-10-CM

## 2023-01-06 RX ORDER — PROCHLORPERAZINE 25 MG/1
SUPPOSITORY RECTAL
Qty: 9 EACH | Refills: 3 | Status: SHIPPED | OUTPATIENT
Start: 2023-01-06 | End: 2024-02-02

## 2023-01-06 RX ORDER — URINE ACETONE TEST STRIPS
STRIP MISCELLANEOUS
Qty: 50 STRIP | Refills: 3 | Status: SHIPPED | OUTPATIENT
Start: 2023-01-06

## 2023-01-06 RX ORDER — PROCHLORPERAZINE 25 MG/1
SUPPOSITORY RECTAL
Qty: 3 EACH | Refills: 4 | OUTPATIENT
Start: 2023-01-06

## 2023-01-06 RX ORDER — PROCHLORPERAZINE 25 MG/1
SUPPOSITORY RECTAL
Qty: 1 EACH | Refills: 3 | OUTPATIENT
Start: 2023-01-06

## 2023-01-06 NOTE — TELEPHONE ENCOUNTER
Continuous Blood Gluc Sensor (DEXCOM G6 SENSOR) MISC        Last Written Prescription Date:  01-  Last Fill Quantity: 9,   # refills: 3  Last Office Visit : 11-  Future Office visit:  02-    DUPLICATE

## 2023-01-06 NOTE — TELEPHONE ENCOUNTER
Continuous Blood Gluc Sensor (DEXCOM G6 SENSOR) MISC      Last Written Prescription Date:  01-  Last Fill Quantity: 9 each,   # refills: 3  Last Office Visit : 11-  Future Office visit:  02-    Routing refill request to provider for review/approval because:      Duplicate request, However, pharmacy is different

## 2023-01-06 NOTE — TELEPHONE ENCOUNTER
Pharmacy calling back stating their refill request was denied and they would like to speak with care team. Please call.

## 2023-01-06 NOTE — TELEPHONE ENCOUNTER
Patient mother calling wondering if script can be sent to [facility] pharmacy instead of the [facility] specialty pharmacy as patient is completely out of her sensors. Patients mother also stated she will need a script for her transmitter as well. Please call.

## 2023-01-06 NOTE — TELEPHONE ENCOUNTER
[facility] Call Center    Phone Message    May a detailed message be left on voicemail: no    Reason for Call: Other: Pharmacy called stating they received the script for patients  but not the sensors. Pharmacy requesting script to be resent. Thank you     Action Taken: Message routed to:  Other: endo    Travel Screening: Not Applicable

## 2023-01-24 ENCOUNTER — TELEPHONE (OUTPATIENT)
Dept: ENDOCRINOLOGY | Facility: CLINIC | Age: 23
End: 2023-01-24
Payer: COMMERCIAL

## 2023-01-24 DIAGNOSIS — E10.65 TYPE 1 DIABETES MELLITUS WITH HYPERGLYCEMIA (H): Primary | ICD-10-CM

## 2023-01-24 NOTE — TELEPHONE ENCOUNTER
Spoke to pt over the phone 1/24/23 and notified pt that labs had been ordered. Pt declined to schedule labs at this time. Provided call back number.

## 2023-01-24 NOTE — TELEPHONE ENCOUNTER
----- Message from Corina Rosario PA-C sent at 1/24/2023  2:43 PM CST -----  Sure. I placed a order.  Araseli    ----- Message -----  From: Nandini Cool  Sent: 1/24/2023  11:52 AM CST  To: CM Goldstein,     Pt would like to know if she could get her A1c checked prior to virtual visit on 2/21/23? Please advise when able.     Thank you,   Nandini

## 2023-02-13 DIAGNOSIS — E10.65 UNCONTROLLED TYPE 1 DIABETES MELLITUS WITH HYPERGLYCEMIA (H): ICD-10-CM

## 2023-02-13 RX ORDER — PROCHLORPERAZINE 25 MG/1
SUPPOSITORY RECTAL
Qty: 1 EACH | Refills: 3 | Status: SHIPPED | OUTPATIENT
Start: 2023-02-13 | End: 2023-08-29

## 2023-02-14 NOTE — PROGRESS NOTES
Outcome for 02/14/23 2:15 PM: Data uploaded on Dexcom  Alexa Villanueva MA  Outcome for 02/17/23 10:03 AM: Dexcom emailed to provider  Germaine Candelaria LPN

## 2023-02-17 DIAGNOSIS — E10.65 TYPE 1 DIABETES MELLITUS WITH HYPERGLYCEMIA (H): Primary | ICD-10-CM

## 2023-02-20 ENCOUNTER — LAB (OUTPATIENT)
Dept: LAB | Facility: CLINIC | Age: 23
End: 2023-02-20
Payer: COMMERCIAL

## 2023-02-20 DIAGNOSIS — E10.65 UNCONTROLLED TYPE 1 DIABETES MELLITUS WITH HYPERGLYCEMIA (H): ICD-10-CM

## 2023-02-20 DIAGNOSIS — E10.65 TYPE 1 DIABETES MELLITUS WITH HYPERGLYCEMIA (H): ICD-10-CM

## 2023-02-20 LAB
CREAT SERPL-MCNC: 1.07 MG/DL (ref 0.51–0.95)
GFR SERPL CREATININE-BSD FRML MDRD: 74 ML/MIN/1.73M2
HBA1C MFR BLD: 8.2 % (ref 0–5.6)

## 2023-02-20 PROCEDURE — 82565 ASSAY OF CREATININE: CPT

## 2023-02-20 PROCEDURE — 83036 HEMOGLOBIN GLYCOSYLATED A1C: CPT

## 2023-02-20 PROCEDURE — 36415 COLL VENOUS BLD VENIPUNCTURE: CPT

## 2023-02-21 ENCOUNTER — VIRTUAL VISIT (OUTPATIENT)
Dept: ENDOCRINOLOGY | Facility: CLINIC | Age: 23
End: 2023-02-21
Payer: COMMERCIAL

## 2023-02-21 ENCOUNTER — TELEPHONE (OUTPATIENT)
Dept: ENDOCRINOLOGY | Facility: CLINIC | Age: 23
End: 2023-02-21

## 2023-02-21 DIAGNOSIS — E10.65 TYPE 1 DIABETES MELLITUS WITH HYPERGLYCEMIA (H): Primary | ICD-10-CM

## 2023-02-21 DIAGNOSIS — E10.65 UNCONTROLLED TYPE 1 DIABETES MELLITUS WITH HYPERGLYCEMIA (H): ICD-10-CM

## 2023-02-21 PROCEDURE — 99214 OFFICE O/P EST MOD 30 MIN: CPT | Mod: VID | Performed by: PHYSICIAN ASSISTANT

## 2023-02-21 RX ORDER — INSULIN GLARGINE 100 [IU]/ML
INJECTION, SOLUTION SUBCUTANEOUS
Qty: 30 ML | Refills: 3 | COMMUNITY
Start: 2023-02-21 | End: 2023-03-27

## 2023-02-21 RX ORDER — INSULIN LISPRO 100 [IU]/ML
INJECTION, SOLUTION INTRAVENOUS; SUBCUTANEOUS
Qty: 60 ML | Refills: 3 | COMMUNITY
Start: 2023-02-21

## 2023-02-21 ASSESSMENT — ENCOUNTER SYMPTOMS
NECK MASS: 0
HOT FLASHES: 0
FLANK PAIN: 1
CONSTIPATION: 1
PANIC: 1
SINUS PAIN: 0
POLYDIPSIA: 1
DISTURBANCES IN COORDINATION: 0
WEAKNESS: 1
NECK PAIN: 1
SINUS CONGESTION: 0
ABDOMINAL PAIN: 1
ARTHRALGIAS: 0
EYE REDNESS: 0
EYE PAIN: 1
NERVOUS/ANXIOUS: 1
EXERCISE INTOLERANCE: 0
LOSS OF CONSCIOUSNESS: 0
WEIGHT LOSS: 0
NIGHT SWEATS: 1
INSOMNIA: 1
DECREASED APPETITE: 1
SMELL DISTURBANCE: 0
ALTERED TEMPERATURE REGULATION: 0
DIARRHEA: 1
DOUBLE VISION: 1
HEMATURIA: 0
DECREASED LIBIDO: 0
DECREASED CONCENTRATION: 1
CHILLS: 1
RECTAL PAIN: 0
SPEECH CHANGE: 0
BLOOD IN STOOL: 1
FEVER: 0
SYNCOPE: 0
SLEEP DISTURBANCES DUE TO BREATHING: 1
LIGHT-HEADEDNESS: 1
JOINT SWELLING: 0
SORE THROAT: 1
TROUBLE SWALLOWING: 0
BACK PAIN: 1
JAUNDICE: 0
INCREASED ENERGY: 1
HYPERTENSION: 0
NUMBNESS: 0
DIZZINESS: 1
ORTHOPNEA: 1
DEPRESSION: 1
SEIZURES: 1
DIFFICULTY URINATING: 1
VOMITING: 1
EYE IRRITATION: 0
TASTE DISTURBANCE: 0
LEG PAIN: 1
DYSURIA: 1
STIFFNESS: 0
HEADACHES: 1
TINGLING: 0
FATIGUE: 1
HYPOTENSION: 0
MUSCLE WEAKNESS: 1
PALPITATIONS: 1
EYE WATERING: 0
NAUSEA: 1
WEIGHT GAIN: 1
PARALYSIS: 0
HALLUCINATIONS: 0
MYALGIAS: 1
POLYPHAGIA: 0
MUSCLE CRAMPS: 1
BLOATING: 1
BOWEL INCONTINENCE: 0
HEARTBURN: 1
HOARSE VOICE: 0
MEMORY LOSS: 1
TREMORS: 0

## 2023-02-21 ASSESSMENT — PATIENT HEALTH QUESTIONNAIRE - PHQ9
SUM OF ALL RESPONSES TO PHQ QUESTIONS 1-9: 20
SUM OF ALL RESPONSES TO PHQ QUESTIONS 1-9: 20
10. IF YOU CHECKED OFF ANY PROBLEMS, HOW DIFFICULT HAVE THESE PROBLEMS MADE IT FOR YOU TO DO YOUR WORK, TAKE CARE OF THINGS AT HOME, OR GET ALONG WITH OTHER PEOPLE: EXTREMELY DIFFICULT

## 2023-02-21 NOTE — LETTER
Date:February 22, 2023      Provider requested that no letter be sent. Do not send.       [facility]

## 2023-02-21 NOTE — LETTER
2/21/2023       RE: Jerrell Hernandez  [address] [place] [address]     Dear Colleague,    Thank you for referring your patient, Jerrell Hernandez, to the [facility] [place] at [facility]. Please see a copy of my visit note below.    Outcome for 02/14/23 2:15 PM: Data uploaded on Dexcom  Alexa Villanueva MA  Outcome for 02/17/23 10:03 AM: Dexcom emailed to provider  Germaine Candelaria LPN             Due to the COVID 19 pandemic this visit was converted to a video visit in order to help prevent spread of infection in this patient and the general population.    Time of start: 3:08 pm  Time of end: 3:24 pm  Total duration of video visit: 16 minutes.    TERRI Hernandez is a 23 year old female with type 1 diabetes mellitus.  Video visit for diabetes follow up today.  Pt last seen in Nov 2022.  Pt gives a hx of type 1 diabetes mellitus dx at age 9.  She was told she has mild retinopathy and is seen by her Oph staff every 3 months.  She denies any known hx of nephropathy or neuropathy.  She was admitted in January 2022 and April 2022 with DKA. She had been drinking ETOH.  Only other medical hx includes hx of compartment syndrome and s/p appy.  She had COVID for second time in Fall 2022.  For her diabetes, she is no longer using her Medtronic insulin pump. She last used her insulin pump in April 2022.   Pt is currently taking Lantus 36 units subcutaneous each am and Humalog 1 unit/7 gms CHO with meals plus correction.  Pt's A1C was 8.2 % on 2/20/2023.   Previous A1C was 8.1 % in Nov 2022.  I reviewed her DexcomG6 sensor today and her average glucose is 262 with SD 94 and estimated A1C 9.6%.  Her blood sugars are high.    Most of her high blood sugars are after eating.  On ROS today, she tells me she had a college friend pass away from DKA in 2020.  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  Fatigue and some SOB  since COVID. No fevers or chills at this time.   Hx of migraine headaches and nausea with headaches.  She denies blurred vision, abd pain, dysuria or hematuria.  Loose stools when she is anxious. She has tested negative for celiac in the past.  No sx of neuropathy or foot ulcers.  LMP was 3 weeks ago. Pt has an IUD.    Diabetes Care  Retinopathy:mild retinopathy per patient; she has Oph follow up in 3 months and last seen in Dec 2022.  Nephropathy:none; urine microalbuminuria negative in July 2022.  Neuropathy:none.  Foot Exam:no exam today.  Taking aspirin:no.  Lipids::LDL 79 in 3/2018.  Insulin : basal and meal time insulin with correction.  Referred to CDE to discuss use of Tandem insulin pump.  Testing: DexcomG6 sensor.  Hypoglycemia tx: pt has Baqsimi nasal glucagon spray to use in case of severe hypoglycemia.            ROS  See under HPI.    Allergies  Allergies   Allergen Reactions     Amoxicillin Hives     Age of reaction/How long ago was it? childhood  Have they had the medication since? no       Azithromycin Hives       Medications  Current Outpatient Medications   Medication Sig Dispense Refill     acetone urine (KETOSTIX) test strip Patient to test when blood glucose is >300x2 or when sick and vomiting. 50 strip 3     blood glucose monitoring (ARLINE CONTOUR NEXT) test strip Use to test blood sugar 13 times daily during dance season. 1200 each 4     Continuous Blood Gluc  (DEXCOM G6 ) RUSTAM Use to read blood sugars as per 's instructions. 1 each 0     Continuous Blood Gluc Sensor (DEXCOM G6 SENSOR) MISC Change every 10 days. 9 each 3     Continuous Blood Gluc Transmit (DEXCOM G6 TRANSMITTER) MISC CHANGE EVERY 90 DAYS 1 each 3     Glucagon (BAQSIMI ONE PACK) 3 MG/DOSE POWD Spray 3 mg in nostril See Admin Instructions USE ONLY FOR SEVERE HYPOGLYCEMIA. 1 each 3     HUMALOG KWIKPEN 100 UNIT/ML soln Inject 1 unit/5 gms CHO with meal with correction. Pt uses approx 45 units daily. 60  mL 3     insulin glargine (LANTUS SOLOSTAR) 100 UNIT/ML pen Inject 36 units subcutaneous daily. 30 mL 3     insulin pen needle (BD OPAL U/F) 32G X 4 MM miscellaneous Use 3-4 daily. 300 each 3     insulin syringes, disposable, U-100 0.3 ML MISC Use only for insulin injection in case insulin pump fails. 25 each 1     blood glucose monitoring (ONETOUCH ULTRA) test strip Patient tests 8 times/day (Patient not taking: Reported on 11/22/2022) 720 each 3     norgestimate-ethinyl estradiol (ORTHO-CYCLEN, SPRINTEC) 0.25-35 MG-MCG per tablet Take 1 tablet by mouth daily (Patient not taking: Reported on 11/18/2021) 84 tablet 3       Family History  family history includes Diabetes in an other family member; Thyroid Disease in her paternal grandmother.    Social History   reports that she has never smoked. She has never used smokeless tobacco. She reports current alcohol use. She reports that she does not use drugs.     Past Medical History  Past Medical History:   Diagnosis Date     Chronic generalized abdominal pain     constipation     Type I (juvenile type) diabetes mellitus without mention of complication, uncontrolled 2009    previosuly followed at [facility]       Past Surgical History:   Procedure Laterality Date     ADENOIDECTOMY       APPENDECTOMY  2009     SINUS SURGERY         Physical Exam    No exam today.    RESULTS  Creatinine   Date Value Ref Range Status   02/20/2023 1.07 (H) 0.51 - 0.95 mg/dL Final   05/12/2019 0.68 0.52 - 1.04 mg/dL Final     GFR Estimate   Date Value Ref Range Status   02/20/2023 74 >60 mL/min/1.73m2 Final     Comment:     eGFR calculated using 2021 CKD-EPI equation.   05/12/2019 >90 >60 mL/min/[1.73_m2] Final     Comment:     Non  GFR Calc  Starting 12/18/2018, serum creatinine based estimated GFR (eGFR) will be   calculated using the Chronic Kidney Disease Epidemiology Collaboration   (CKD-EPI) equation.       Hemoglobin A1C   Date Value Ref Range Status   02/20/2023 8.2  (H) 0.0 - 5.6 % Final     Comment:     Normal <5.7%   Prediabetes 5.7-6.4%    Diabetes 6.5% or higher     Note: Adopted from ADA consensus guidelines.   12/15/2016 10.0 % Final     Potassium   Date Value Ref Range Status   11/22/2022 4.6 3.4 - 5.3 mmol/L Final   05/12/2019 3.6 3.4 - 5.3 mmol/L Final     ALT   Date Value Ref Range Status   11/22/2022 10 10 - 35 U/L Final   05/12/2019 19 0 - 50 U/L Final     AST   Date Value Ref Range Status   11/22/2022 13 10 - 35 U/L Final   05/12/2019 7 0 - 45 U/L Final     TSH   Date Value Ref Range Status   11/22/2022 3.05 0.30 - 4.20 uIU/mL Final   07/12/2022 3.74 0.40 - 4.00 mU/L Final   12/26/2019 1.29 0.40 - 4.00 mU/L Final       Cholesterol   Date Value Ref Range Status   03/30/2018 132 <170 mg/dL Final   12/04/2014 154 <170 mg/dL Final     Comment:     LDL Cholesterol is the primary guide to therapy.   The NCEP recommends further evaluation of: patients with cholesterol greater   than 200 mg/dL if additional risk factors are present, cholesterol greater   than   240 mg/dL, triglycerides greater than 150 mg/dL, or HDL less than 40 mg/dL.       HDL Cholesterol   Date Value Ref Range Status   03/30/2018 39 (L) >45 mg/dL Final     Comment:     Low:             <40 mg/dl  Borderline low:   40-45 mg/dl     12/04/2014 33 (L) >45 mg/dL Final     LDL Cholesterol Calculated   Date Value Ref Range Status   03/30/2018 79 <110 mg/dL Final   12/04/2014 78 0 - 129 mg/dL Final     Comment:     LDL Cholesterol is the primary guide to therapy: LDL-cholesterol goal in high   risk patients is <100 mg/dL and in very high risk patients is <70 mg/dL.       Triglycerides   Date Value Ref Range Status   03/30/2018 70 <90 mg/dL Final   12/04/2014 217 (H) 0 - 150 mg/dL Final     Cholesterol/HDL Ratio   Date Value Ref Range Status   12/04/2014 4.7 0.0 - 5.0 Final   10/25/2012 4.6 0.0 - 5.0 Final         ASSESSMENT/PLAN:    1.  TYPE 1 DIABETES MELLITUS: Uncontrolled type 1 diabetes mellitus complicated  by retinopathy.  Pt is interested in using a Tandem insulin pump.  She was referred to diabetes ed to discuss getting the Tandem insulin pump. She is currently using a DexcomG6 sensor.  I asked her to change her Humalog I/C ratio to 1:5 ( was 1:7 ) with correction 1 unit/50 for BG > 140.  Continue Lantus 36 units subcutaneous each am.  Pt denies sx of neuropathy. Denies foot ulcers.  Her urine microalbuminuria was negative in 7/2022. Creat 1.07 with GFR 74 mL/min on 2/20/2023.  No vitals today.    2.  RETINOPATHY: Pt seen by Oph every 3 months- last seen in Dec 2022.    3.  HX OF INTERMITTENT LOOSE STOOLS: Labs for celiac negative. This occurs with increase anxiety.    4.  MENTAL STATUS: Denies thoughts of self harm at this time.  She is seeing a therapist.    5.  FOLLOW UP: with me in June 2023.  Referred to diabetes ed to start Tandem insulin pump.    Time spent reviewing chart, labs and DexcomG6 sensor download data today = 6 minutes.  Time for video visit today  = 16 minutes.  Time for documentation today = 15 minutes.    TOTAL TIME FOR VISIT TODAY = 37  minutes.    Corina Rosario PA-C              Again, thank you for allowing me to participate in the care of your patient.      Sincerely,    Corina Rosario PA-C

## 2023-02-21 NOTE — PROGRESS NOTES
Due to the COVID 19 pandemic this visit was converted to a video visit in order to help prevent spread of infection in this patient and the general population.    Time of start: 3:08 pm  Time of end: 3:24 pm  Total duration of video visit: 16 minutes.    TERRI Hernandez is a 23 year old female with type 1 diabetes mellitus.  Video visit for diabetes follow up today.  Pt last seen in Nov 2022.  Pt gives a hx of type 1 diabetes mellitus dx at age 9.  She was told she has mild retinopathy and is seen by her Oph staff every 3 months.  She denies any known hx of nephropathy or neuropathy.  She was admitted in January 2022 and April 2022 with DKA. She had been drinking ETOH.  Only other medical hx includes hx of compartment syndrome and s/p appy.  She had COVID for second time in Fall 2022.  For her diabetes, she is no longer using her Medtronic insulin pump. She last used her insulin pump in April 2022.   Pt is currently taking Lantus 36 units subcutaneous each am and Humalog 1 unit/7 gms CHO with meals plus correction.  Pt's A1C was 8.2 % on 2/20/2023.   Previous A1C was 8.1 % in Nov 2022.  I reviewed her DexcomG6 sensor today and her average glucose is 262 with SD 94 and estimated A1C 9.6%.  Her blood sugars are high.    Most of her high blood sugars are after eating.  On ROS today, she tells me she had a college friend pass away from DKA in 2020.  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  Fatigue and some SOB since COVID. No fevers or chills at this time.   Hx of migraine headaches and nausea with headaches.  She denies blurred vision, abd pain, dysuria or hematuria.  Loose stools when she is anxious. She has tested negative for celiac in the past.  No sx of neuropathy or foot ulcers.  LMP was 3 weeks ago. Pt has an IUD.    Diabetes Care  Retinopathy:mild retinopathy per patient; she has Oph follow up in 3 months and last seen in Dec 2022.  Nephropathy:none; urine microalbuminuria  negative in July 2022.  Neuropathy:none.  Foot Exam:no exam today.  Taking aspirin:no.  Lipids::LDL 79 in 3/2018.  Insulin : basal and meal time insulin with correction.  Referred to CDE to discuss use of Tandem insulin pump.  Testing: DexcomG6 sensor.  Hypoglycemia tx: pt has Baqsimi nasal glucagon spray to use in case of severe hypoglycemia.            ROS  See under HPI.    Allergies  Allergies   Allergen Reactions     Amoxicillin Hives     Age of reaction/How long ago was it? childhood  Have they had the medication since? no       Azithromycin Hives       Medications  Current Outpatient Medications   Medication Sig Dispense Refill     acetone urine (KETOSTIX) test strip Patient to test when blood glucose is >300x2 or when sick and vomiting. 50 strip 3     blood glucose monitoring (Navita CONTOUR NEXT) test strip Use to test blood sugar 13 times daily during dance season. 1200 each 4     Continuous Blood Gluc  (DEXCOM G6 ) RUSTAM Use to read blood sugars as per 's instructions. 1 each 0     Continuous Blood Gluc Sensor (DEXCOM G6 SENSOR) MISC Change every 10 days. 9 each 3     Continuous Blood Gluc Transmit (DEXCOM G6 TRANSMITTER) MISC CHANGE EVERY 90 DAYS 1 each 3     Glucagon (BAQSIMI ONE PACK) 3 MG/DOSE POWD Spray 3 mg in nostril See Admin Instructions USE ONLY FOR SEVERE HYPOGLYCEMIA. 1 each 3     HUMALOG KWIKPEN 100 UNIT/ML soln Inject 1 unit/5 gms CHO with meal with correction. Pt uses approx 45 units daily. 60 mL 3     insulin glargine (LANTUS SOLOSTAR) 100 UNIT/ML pen Inject 36 units subcutaneous daily. 30 mL 3     insulin pen needle (BD OPAL U/F) 32G X 4 MM miscellaneous Use 3-4 daily. 300 each 3     insulin syringes, disposable, U-100 0.3 ML MISC Use only for insulin injection in case insulin pump fails. 25 each 1     blood glucose monitoring (ONETOUCH ULTRA) test strip Patient tests 8 times/day (Patient not taking: Reported on 11/22/2022) 720 each 3     norgestimate-ethinyl  estradiol (ORTHO-CYCLEN, SPRINTEC) 0.25-35 MG-MCG per tablet Take 1 tablet by mouth daily (Patient not taking: Reported on 11/18/2021) 84 tablet 3       Family History  family history includes Diabetes in an other family member; Thyroid Disease in her paternal grandmother.    Social History   reports that she has never smoked. She has never used smokeless tobacco. She reports current alcohol use. She reports that she does not use drugs.     Past Medical History  Past Medical History:   Diagnosis Date     Chronic generalized abdominal pain     constipation     Type I (juvenile type) diabetes mellitus without mention of complication, uncontrolled 2009    previosuly followed at [facility]       Past Surgical History:   Procedure Laterality Date     ADENOIDECTOMY       APPENDECTOMY  2009     SINUS SURGERY         Physical Exam    No exam today.    RESULTS  Creatinine   Date Value Ref Range Status   02/20/2023 1.07 (H) 0.51 - 0.95 mg/dL Final   05/12/2019 0.68 0.52 - 1.04 mg/dL Final     GFR Estimate   Date Value Ref Range Status   02/20/2023 74 >60 mL/min/1.73m2 Final     Comment:     eGFR calculated using 2021 CKD-EPI equation.   05/12/2019 >90 >60 mL/min/[1.73_m2] Final     Comment:     Non  GFR Calc  Starting 12/18/2018, serum creatinine based estimated GFR (eGFR) will be   calculated using the Chronic Kidney Disease Epidemiology Collaboration   (CKD-EPI) equation.       Hemoglobin A1C   Date Value Ref Range Status   02/20/2023 8.2 (H) 0.0 - 5.6 % Final     Comment:     Normal <5.7%   Prediabetes 5.7-6.4%    Diabetes 6.5% or higher     Note: Adopted from ADA consensus guidelines.   12/15/2016 10.0 % Final     Potassium   Date Value Ref Range Status   11/22/2022 4.6 3.4 - 5.3 mmol/L Final   05/12/2019 3.6 3.4 - 5.3 mmol/L Final     ALT   Date Value Ref Range Status   11/22/2022 10 10 - 35 U/L Final   05/12/2019 19 0 - 50 U/L Final     AST   Date Value Ref Range Status   11/22/2022 13 10 - 35 U/L Final    05/12/2019 7 0 - 45 U/L Final     TSH   Date Value Ref Range Status   11/22/2022 3.05 0.30 - 4.20 uIU/mL Final   07/12/2022 3.74 0.40 - 4.00 mU/L Final   12/26/2019 1.29 0.40 - 4.00 mU/L Final       Cholesterol   Date Value Ref Range Status   03/30/2018 132 <170 mg/dL Final   12/04/2014 154 <170 mg/dL Final     Comment:     LDL Cholesterol is the primary guide to therapy.   The NCEP recommends further evaluation of: patients with cholesterol greater   than 200 mg/dL if additional risk factors are present, cholesterol greater   than   240 mg/dL, triglycerides greater than 150 mg/dL, or HDL less than 40 mg/dL.       HDL Cholesterol   Date Value Ref Range Status   03/30/2018 39 (L) >45 mg/dL Final     Comment:     Low:             <40 mg/dl  Borderline low:   40-45 mg/dl     12/04/2014 33 (L) >45 mg/dL Final     LDL Cholesterol Calculated   Date Value Ref Range Status   03/30/2018 79 <110 mg/dL Final   12/04/2014 78 0 - 129 mg/dL Final     Comment:     LDL Cholesterol is the primary guide to therapy: LDL-cholesterol goal in high   risk patients is <100 mg/dL and in very high risk patients is <70 mg/dL.       Triglycerides   Date Value Ref Range Status   03/30/2018 70 <90 mg/dL Final   12/04/2014 217 (H) 0 - 150 mg/dL Final     Cholesterol/HDL Ratio   Date Value Ref Range Status   12/04/2014 4.7 0.0 - 5.0 Final   10/25/2012 4.6 0.0 - 5.0 Final         ASSESSMENT/PLAN:    1.  TYPE 1 DIABETES MELLITUS: Uncontrolled type 1 diabetes mellitus complicated by retinopathy.  Pt is interested in using a Tandem insulin pump.  She was referred to diabetes ed to discuss getting the Tandem insulin pump. She is currently using a DexcomG6 sensor.  I asked her to change her Humalog I/C ratio to 1:5 ( was 1:7 ) with correction 1 unit/50 for BG > 140.  Continue Lantus 36 units subcutaneous each am.  Pt denies sx of neuropathy. Denies foot ulcers.  Her urine microalbuminuria was negative in 7/2022. Creat 1.07 with GFR 74 mL/min on  2/20/2023.  No vitals today.    2.  RETINOPATHY: Pt seen by Oph every 3 months- last seen in Dec 2022.    3.  HX OF INTERMITTENT LOOSE STOOLS: Labs for celiac negative. This occurs with increase anxiety.    4.  MENTAL STATUS: Denies thoughts of self harm at this time.  She is seeing a therapist.    5.  FOLLOW UP: with me in June 2023.  Referred to diabetes ed to start Tandem insulin pump.    Time spent reviewing chart, labs and DexcomG6 sensor download data today = 6 minutes.  Time for video visit today  = 16 minutes.  Time for documentation today = 15 minutes.    TOTAL TIME FOR VISIT TODAY = 37  minutes.    Corina Rosario PA-C

## 2023-02-21 NOTE — NURSING NOTE
Is the patient currently in the state of [place]? NO    Visit mode:VIDEO    If the visit is dropped, the patient can be reconnected by: VIDEO VISIT: Text to cell phone: [phone]    Will anyone else be joining the visit? NO    How would you like to obtain your AVS? MyChart    Are changes needed to the allergy or medication list? NO    Reason for visit:   Chief Complaint   Patient presents with     Video Visit     Type 1 Diabetes     Leslie Apple MA

## 2023-02-21 NOTE — TELEPHONE ENCOUNTER
----- Message from Felicity Harmon RN sent at 2/21/2023  3:35 PM CST -----  Please call pt for a pre-pump visit with any CDE not urgent    Thanks  Felicity BLAS, RN, [facility]  Certified Diabetes Care and   [facility] Endocrinology and Diabetes  [facility]  [address] [place] [address]  Phone [phone]    ----- Message -----  From: Corina Rosario PA-C  Sent: 2/21/2023   3:30 PM CST  To: [facility] Certified Diabetes Educators Adult [facility]    Hi:  23 yr old type 1 DM interested in starting on the Tandem insulin pump.  Referral placed.  Thanks,  Araseli

## 2023-02-21 NOTE — NURSING NOTE
Depression Response    Patient completed the PHQ-9 assessment for depression and scored >9? Yes  Question 9 on the PHQ-9 was positive for suicidality? Yes  Does patient have current mental health provider? Yes    Is this a virtual visit? Yes   Does patient have suicidal ideation (positive question 9)? No - offer to place Mental Health Referral.  Referral order pended    I personally notified the following: visit provider

## 2023-03-03 ENCOUNTER — TELEPHONE (OUTPATIENT)
Dept: ENDOCRINOLOGY | Facility: CLINIC | Age: 23
End: 2023-03-03
Payer: COMMERCIAL

## 2023-03-03 NOTE — TELEPHONE ENCOUNTER
[facility] Call Center    Phone Message    May a detailed message be left on voicemail: yes     Reason for Call: Other: [employer] called - folllow up on fax of request for information for diabetic supplies and infusiton set for patient that was sent on 2/28/23.  Please return call to [phone] Option 2     Action Taken: Other: endo    Travel Screening: Not Applicable

## 2023-03-08 NOTE — TELEPHONE ENCOUNTER
[facility] Call Center    Phone Message    May a detailed message be left on voicemail: yes     Reason for Call: Other: Per [employer] just sent the order form today. Please sign and send back thank you!     Action Taken: Message routed to:  [facility] ([facility] ENDO    Travel Screening: Not Applicable

## 2023-03-15 ENCOUNTER — VIRTUAL VISIT (OUTPATIENT)
Dept: EDUCATION SERVICES | Facility: CLINIC | Age: 23
End: 2023-03-15
Payer: COMMERCIAL

## 2023-03-15 DIAGNOSIS — E10.65 UNCONTROLLED TYPE 1 DIABETES MELLITUS WITH HYPERGLYCEMIA (H): Primary | ICD-10-CM

## 2023-03-15 PROCEDURE — G0108 DIAB MANAGE TRN  PER INDIV: HCPCS | Mod: VID

## 2023-03-15 NOTE — PROGRESS NOTES
Video-Visit Details  Type of service:  Video Visit  Patient consented to video visit  Video Start Time:  8:01 AM  Video End Time:   8:55 AM  Originating Location (pt. Location): Home  Distant Location (provider location):   [employer] DIABETES EDUCATION [place]   Platform used for Video Visit: [employer]      Diabetes Self-Management Education & Support    SUBJECTIVE:  Jerrell Hernandez presents today for education related to planning for an insulin pump  She is accompanied by self  Patient concerns: is concerned about BG fluctuations, hard to manage, had to drop out of grad school last quarter  Year or age diagnosed: Age 9  Past Diabetes Education: Yes    Socio/Economic History:  Support system: family, friend(s) and therapy  Living Situation: lives with room mates in apartment  Occupation: Research Assistant,Grad school-Psychology, [employer]     MONITORING:  BG meter: Dexcom CGM meter  BG results:       RAPID-ACTING INSULIN CALCULATIONS:  Does patient currently count carbs? yes, counts in grams and guesstimates  Is instruction indicated? NO  How is insulin determined for correction: uses correction factor or follows sliding scale    PROBLEM SOLVING:  Patient is at risk of hypoglycemia?: YES and Usual treatment is Fruit snacks, Gatorade, crackers. Partial hypoglycemia unawareness-feels 70's  Hospitalizations for hyper or hypoglycemia: Yes (Please explain): DKA  What is considered a high blood sugar? Over 250    How is high BG treated: Correction  Does Patient test for ketones? Yes   At what level of blood glucose are ketones tested? over 300  If ketones are present, what action is taken? Keep checking BG every hour and correcting. If vomiting-ER    Physical Activity:    no regular exercise program. Tries to walk 15 minutes day  What accomodations are made for exercising: none    Diabetes knowledge and skills assessment:   Patient is knowledgeable in diabetes management concepts related to:  Monitoring, Taking Medication, Problem Solving, Reducing Risks, Healthy Coping and Insulin Pump Concepts Balancing glucose and insulin  Carbohydrate counting  Calculating boluses  Problem solving with insulin pump therapy (BG monitoring; hypoglycemia signs/symptoms, treatment (glucagon) and prevention; hyperglycemia signs/symptoms, treatment and prevention; ketones, DKA signs/symptoms and prevention)    Patient needs further education on the following diabetes management concepts: Review Healthy Eating and Being Active    Based on learning assessment above, most appropriate setting for further diabetes education would be: Individual setting.    EDUCATION PROVIDED TODAY  Explained the way an insulin pump works, in terms that described the function of a basal rate and a bolus calculator.    Demonstrated the operation of the Tandem CIQ.  Briefly discussed OP5  Explained the unique features of the pump.      Explained that having a pump does not take the place of checking his blood glucoses, counting carbs or remembering to push the button to deliver the bolus.   Described how the insulin delivers insulin through a cannula inserted under the skin and attached to the pump itself     Reviewed some of the increased risks associated with using a pump, i.e. DKA, especially if not checking BG at least 3-4 times daily or using a continuous glucose monitor (CGM).    Explained in general terms the costs associated with using an insulin pump, including the disposables and insulin.  Explained the process for obtaining a pump:  Approval by diabetes management team, application to the pump company, approval by insurance company, and necessary pre-training and post-pump follow-up.    Explained the need to follow up on a regular basis with diabetes care team in order to continue to have insulin pumping supplies approved by insurance.    Assessment:  T1 Pre Pump Assessment Video Visit. Previously on Medtronic; MDI for one year.  Alok . Close friend passed away one year ago from DKA. Motivated to improve control.    Current Regimen:  Lantus 36 units at noon  Humalog 1:5, correction 1 unit per 50>150. Humalog approx TDD: 100 units  Current weight 170    PLAN:  *Can download Tandem Mobile Simulator Romelia  *Will start process to proceed with Tandem pump  *Calorie Nicko or My Fitness Pal mobile Apps are helpful tools with carb counting    Follow-up:   Pump start appointment made: TBD-Virtual, Flexible  *Will need to schedule 1-2 week follow up with educator after starting pump    See Patient Instructions, AVS via My Chart    Time Spent: 60 minutes  Encounter Type: Individual     Any diabetes medication dose changes were made via the CDE Protocol Collaborative Practice Agreement with referring provider.  A copy of this encounter was provided to patient's referring provider.

## 2023-03-15 NOTE — LETTER
3/15/2023         RE: Jerrell Hernandez  [address] [place] [address]        Dear Colleague,    Thank you for referring your patient, Jerrell Hernandez, to the [facility]. Please see a copy of my visit note below.    Video-Visit Details  Type of service:  Video Visit  Patient consented to video visit  Video Start Time:  8:01 AM  Video End Time:   8:55 AM  Originating Location (pt. Location): Home  Distant Location (provider location):  [facility] DIABETES EDUCATION [place]   Platform used for Video Visit: [employer]      Diabetes Self-Management Education & Support    SUBJECTIVE:  Jerrell Hernandez presents today for education related to planning for an insulin pump  She is accompanied by self  Patient concerns: is concerned about BG fluctuations, hard to manage, had to drop out of grad school last quarter  Year or age diagnosed: Age 9  Past Diabetes Education: Yes    Socio/Economic History:  Support system: family, friend(s) and therapy  Living Situation: lives with room mates in apartment  Occupation: Research Assistant,Grad school-Psychology, [employer]     MONITORING:  BG meter: Dexcom CGM meter  BG results:       RAPID-ACTING INSULIN CALCULATIONS:  Does patient currently count carbs? yes, counts in grams and guesstimates  Is instruction indicated? NO  How is insulin determined for correction: uses correction factor or follows sliding scale    PROBLEM SOLVING:  Patient is at risk of hypoglycemia?: YES and Usual treatment is Fruit snacks, Gatorade, crackers. Partial hypoglycemia unawareness-feels 70's  Hospitalizations for hyper or hypoglycemia: Yes (Please explain): DKA  What is considered a high blood sugar? Over 250    How is high BG treated: Correction  Does Patient test for ketones? Yes   At what level of blood glucose are ketones tested? over 300  If ketones are present, what action is taken? Keep checking BG every hour and correcting. If  vomiting-ER    Physical Activity:    no regular exercise program. Tries to walk 15 minutes day  What accomodations are made for exercising: none    Diabetes knowledge and skills assessment:   Patient is knowledgeable in diabetes management concepts related to: Monitoring, Taking Medication, Problem Solving, Reducing Risks, Healthy Coping and Insulin Pump Concepts Balancing glucose and insulin  Carbohydrate counting  Calculating boluses  Problem solving with insulin pump therapy (BG monitoring; hypoglycemia signs/symptoms, treatment (glucagon) and prevention; hyperglycemia signs/symptoms, treatment and prevention; ketones, DKA signs/symptoms and prevention)    Patient needs further education on the following diabetes management concepts: Review Healthy Eating and Being Active    Based on learning assessment above, most appropriate setting for further diabetes education would be: Individual setting.    EDUCATION PROVIDED TODAY  Explained the way an insulin pump works, in terms that described the function of a basal rate and a bolus calculator.    Demonstrated the operation of the Tandem CIQ.  Briefly discussed OP5  Explained the unique features of the pump.      Explained that having a pump does not take the place of checking his blood glucoses, counting carbs or remembering to push the button to deliver the bolus.   Described how the insulin delivers insulin through a cannula inserted under the skin and attached to the pump itself     Reviewed some of the increased risks associated with using a pump, i.e. DKA, especially if not checking BG at least 3-4 times daily or using a continuous glucose monitor (CGM).    Explained in general terms the costs associated with using an insulin pump, including the disposables and insulin.  Explained the process for obtaining a pump:  Approval by diabetes management team, application to the pump company, approval by insurance company, and necessary pre-training and post-pump  follow-up.    Explained the need to follow up on a regular basis with diabetes care team in order to continue to have insulin pumping supplies approved by insurance.    Assessment:  T1 Pre Pump Assessment Video Visit. Previously on Medtronic; MDI for one year. Warranty . Close friend passed away one year ago from DKA. Motivated to improve control.    Current Regimen:  Lantus 36 units at noon  Humalog 1:5, correction 1 unit per 50>150. Humalog approx TDD: 100 units  Current weight 170    PLAN:  *Can download Tandem Mobile Simulator Romelia  *Will start process to proceed with Tandem pump  *Calorie Nicko or My Fitness Pal mobile Apps are helpful tools with carb counting    Follow-up:   Pump start appointment made: TBD-Virtual, Flexible  *Will need to schedule 1-2 week follow up with educator after starting pump    See Patient Instructions, AVS via My Chart    Time Spent: 60 minutes  Encounter Type: Individual     Any diabetes medication dose changes were made via the CDE Protocol Collaborative Practice Agreement with referring provider.  A copy of this encounter was provided to patient's referring provider.        Again, thank you for allowing me to participate in the care of your patient.        Sincerely,        Bonnie Greer RN

## 2023-03-15 NOTE — PATIENT INSTRUCTIONS
PLAN:  *Can download Tandem Mobile Simulator Romelia  *Will start process to proceed with Tandem pump  *Calorie Nicko or My Fitness Pal mobile Apps are helpful tools with carb counting    Follow-up:   Pump start appointment made: TBD-Virtual, Flexible  *Will need to schedule 1-2 week follow up with educator after starting pump

## 2023-03-25 DIAGNOSIS — E10.65 UNCONTROLLED TYPE 1 DIABETES MELLITUS WITH HYPERGLYCEMIA (H): ICD-10-CM

## 2023-03-27 DIAGNOSIS — E10.65 UNCONTROLLED TYPE 1 DIABETES MELLITUS WITH HYPERGLYCEMIA (H): ICD-10-CM

## 2023-03-27 RX ORDER — INSULIN GLARGINE 100 [IU]/ML
INJECTION, SOLUTION SUBCUTANEOUS
Refills: 0 | OUTPATIENT
Start: 2023-03-27

## 2023-03-27 RX ORDER — INSULIN GLARGINE 100 [IU]/ML
INJECTION, SOLUTION SUBCUTANEOUS
Qty: 45 ML | Refills: 0 | Status: SHIPPED | OUTPATIENT
Start: 2023-03-27

## 2023-03-27 NOTE — TELEPHONE ENCOUNTER
insulin glargine (LANTUS SOLOSTAR) 100 UNIT/ML pen      Last Written Prescription Date:  02/21/23  Last Fill Quantity: 30mL,   # refills: 3  Last Office Visit : 02/21/23  Future Office visit:  06/13/23    Routing refill request to provider for review/approval because:  Insulin - refilled per clinic

## 2023-03-27 NOTE — TELEPHONE ENCOUNTER
insulin glargine (LANTUS SOLOSTAR) 100 UNIT/ML pen        Last Written Prescription Date:  03/27/23  Last Fill Quantity: 45mL,   # refills: 0  Last Office Visit : 02/21/23  Future Office visit:  06/13/23    Routing refill request to provider for review/approval because:  Insulin - refilled per clinic

## 2023-04-15 ENCOUNTER — HEALTH MAINTENANCE LETTER (OUTPATIENT)
Age: 23
End: 2023-04-15

## 2023-05-15 ENCOUNTER — MYC MEDICAL ADVICE (OUTPATIENT)
Dept: EDUCATION SERVICES | Facility: CLINIC | Age: 23
End: 2023-05-15
Payer: COMMERCIAL

## 2023-05-15 DIAGNOSIS — E10.65 UNCONTROLLED TYPE 1 DIABETES MELLITUS WITH HYPERGLYCEMIA (H): Primary | ICD-10-CM

## 2023-05-26 RX ORDER — INSULIN LISPRO 100 [IU]/ML
INJECTION, SOLUTION INTRAVENOUS; SUBCUTANEOUS
Qty: 100 ML | Refills: 1 | Status: SHIPPED | OUTPATIENT
Start: 2023-05-26 | End: 2024-04-25

## 2023-05-26 NOTE — TELEPHONE ENCOUNTER
Attempted to contact patient prior to 5/31 pump start.    Unable to leave message; voicemail box is full.    Please see My Chart Message.    Bonnie Greer RN, Diabetes Educator  Diabetes Education Department  [facility], [facility] and [place]  [phone]

## 2023-05-26 NOTE — TELEPHONE ENCOUNTER
Humalog vials sent to pharmacy.    Pump orders sent to Araseli Rosario for signing.    Advised to take Lantus Tuesday at 10am-12pm.    Bonnie Greer RN, Diabetes Educator  Diabetes Education Department  [facility], [facility] [place]  [phone]

## 2023-05-31 ENCOUNTER — ALLIED HEALTH/NURSE VISIT (OUTPATIENT)
Dept: EDUCATION SERVICES | Facility: CLINIC | Age: 23
End: 2023-05-31
Payer: COMMERCIAL

## 2023-05-31 ENCOUNTER — MYC MEDICAL ADVICE (OUTPATIENT)
Dept: EDUCATION SERVICES | Facility: CLINIC | Age: 23
End: 2023-05-31

## 2023-05-31 DIAGNOSIS — E10.65 TYPE 1 DIABETES MELLITUS WITH HYPERGLYCEMIA (H): ICD-10-CM

## 2023-05-31 PROCEDURE — G0108 DIAB MANAGE TRN  PER INDIV: HCPCS

## 2023-05-31 RX ORDER — GLUCAGON 3 MG/1
3 POWDER NASAL SEE ADMIN INSTRUCTIONS
Qty: 1 EACH | Refills: 3 | Status: SHIPPED | OUTPATIENT
Start: 2023-05-31

## 2023-05-31 RX ORDER — URINE ACETONE TEST STRIPS
STRIP MISCELLANEOUS
Qty: 50 STRIP | Refills: 1 | Status: SHIPPED | OUTPATIENT
Start: 2023-05-31

## 2023-05-31 NOTE — PROGRESS NOTES
Diabetes Self Management Training: Insulin Pump Start    SUBJECTIVE:  Jerrell Hernandez presents for an insulin pump start. Previously on Medtronic pump.  Pump Type: Tandem CIQ  OBJECTIVE:  Vitals: There were no vitals taken for this visit.    Blood glucose before pump start: 199 mg/dL   Blood glucose after pump start:  244 mg/dL-correction given via pump  Last basal insulin given at 5/30 at 10am    Last bolus insulin given at last evening      ASSESSMENT:    Homework completed: Completed online training and Reviewed user guide  EDUCATION PROVIDED:  Training for the  Tandem CIQ done in accordance with the company training checklist.  Training for the Dexcom G 6-already completed, continuous glucose monitor (CGM) done in accordance with the company training manual  Reviewed the pump menus, icons, symbols.   Explained how the hybrid closed loop (HCL) function works:  Basal modulation, delivery of auto-corrections and suspension of insulin to keep glucose levels at/near target blood glucose. This feature is currently available in the Tandem CIQ and Medtronic pumps.  Linked CGM with the pump.   Paired the pump with the mobile phone woodrow.     Username [email or web address]  Password #[email or web address]    Infusion set type for pump start: [employer]    Problem Solving:   Unexplained high blood glucoses on an insulin pump  Managing hypoglycemia on an insulin pump  Alerts, alarms and when to call the company help line    when to order supplies  how to order supplies    PUMP SETTINGS:  In Control IQ, the Target BG is 110.  Start time Basal Rate U/hr Correction Factor Insulin to Carb Ratio Target BG    Midnight 1.5 45 10 120                               Active Insulin Time: 5  (In Control IQ, Active Insulin Time is defaulted to 5 hours)  Maximum Bolus: 25  [place] Warning: 10 units  Sleep Activity on: Mon- Friday 12am to 8am; Saturday-Sunday 2am-10am    CGM SETTINGS:     High Alert Low Alert Rise Rate Fall Rate    ON-300  ON-80 OFF OFF       PLAN AND FOLLOW-UP:  Patient instructed to contact educator the day after the pump start, and every 3 days, unless otherwise indicated until blood sugars are under optimal control.     Back up plan sent via My Chart.    Bonnie Greer RN, Diabetes Educator  Diabetes Education Department  [facility], [facility] and [place]  [phone]      Time Spent: 60 minutes  Visit Type: Individual    Scanned documents: Insulin pump initiation settings with MD signature, Insulin pump start checklist.

## 2023-06-02 NOTE — TELEPHONE ENCOUNTER
Discussed bolusing before eating and not over treating lows.    Follow up 6/8.    Bonnie Greer RN, Diabetes Educator  Diabetes Education Department  [facility], [facility] and [place]  [phone]

## 2023-06-08 ENCOUNTER — TELEPHONE (OUTPATIENT)
Dept: ENDOCRINOLOGY | Facility: CLINIC | Age: 23
End: 2023-06-08

## 2023-06-08 ENCOUNTER — VIRTUAL VISIT (OUTPATIENT)
Dept: EDUCATION SERVICES | Facility: CLINIC | Age: 23
End: 2023-06-08
Payer: COMMERCIAL

## 2023-06-08 DIAGNOSIS — E10.65 UNCONTROLLED TYPE 1 DIABETES MELLITUS WITH HYPERGLYCEMIA (H): Primary | ICD-10-CM

## 2023-06-08 DIAGNOSIS — E10.65 UNCONTROLLED TYPE 1 DIABETES MELLITUS WITH HYPERGLYCEMIA (H): ICD-10-CM

## 2023-06-08 PROCEDURE — G0108 DIAB MANAGE TRN  PER INDIV: HCPCS | Mod: VID

## 2023-06-08 RX ORDER — WOUND DRESSING ADHESIVE - LIQUID
LIQUID MISCELLANEOUS
Qty: 15 ML | Refills: 1 | Status: SHIPPED | OUTPATIENT
Start: 2023-06-08 | End: 2023-06-08

## 2023-06-08 RX ORDER — WOUND DRESSING ADHESIVE - LIQUID
LIQUID MISCELLANEOUS
Qty: 15 ML | Refills: 1 | Status: SHIPPED | OUTPATIENT
Start: 2023-06-08

## 2023-06-08 NOTE — PROGRESS NOTES
Video-Visit Details  Type of service:  Video Visit  Patient consented to video visit  Video Start Time:  10:54 AM  Video End Time:   11:54 AM  Originating Location (pt. Location): Home  Distant Location (provider location):  Off site  Platform used for Video Visit: [employer]    Diabetes Type Type 1:  Jerrell Hernandez has been using a Tandem insulin pump. She does not use a continuous glucose monitor (CGM).    Jerrell Hernandez uses Dexcom in her insulin pump.    Other diabetes medications patient is taking include:   Patient is not taking a daily asprin.  Patient is not taking a statin.  Patient is not taking an ACE/ARB.    Patient reports had one hypoglycemic episode.    Hypoglycemic symptoms consists of dizzy, shake, fatigue. Partial hypoglycemic unawareness. Patient will usually treat lows with 15 grams-gatorade.  Patient does have glucagon available at home and it is not .  Family has been instructed on how to use in a hypoglycemic emergency.    Patient does reports recent hyperglycemia symptoms, including: in 400's. Patient does follow insulin pump high blood sugar protocol and does have ketone testing strips available for use at home and understands when and how to use them.    Patient is up to date with their yearly eye exam. Mild retinopathy  Patient is up to date with their yearly foot exam.    Insulin pump download reveals:  Patient is checking and average of 0.33 blood sugars per day.  Patient is changing infusion set an average of every 3 days.  Cannula fill patient is administering is .30 units.  Patient has an average of 11.67 manual boluses per day and 16% overrides.  Average suspend duration is 0 minutes/day.  Patient's average blood sugar is 165 +/- 400/56 mg/dL  Percentage of readings above target: 48%  Percentage of readings below target: 1%  Average daily carb intake: 202 grams  Patient's TDD is 66.07 units.  Patient's basal/bolus ratio is 55%/35%    Discussion of behaviors related to  insulin pump use reveals overriding recommending doses, entered wrong amount of carbs once, has been pre meal bolusing, trying to correct lows with only 4oz of gatorade.    See scanned in pump report(s) data below:                Plan:  *Added 12pm-4pm basal of 1.6 units  *ISF changed from 45 to 40  *Follow up with Corina Rosario next week    Bonnie Greer RN, Diabetes Educator  Diabetes Education Department  [facility], [facility] and [place]  [phone]

## 2023-06-08 NOTE — TELEPHONE ENCOUNTER
[facility] Call Center    Phone Message    May a detailed message be left on voicemail: yes     Reason for Call: Other: Per Pharmacy they are limited at what they can order or preorder and at this time they are not about to habe the supplies of Wound Dressing Adhesive (MASTISOL ADHESIVE) LIQD. Per pt is sorry and cannot order supplies. thank you!     Action Taken: Message routed to:  [facility] ENDO    Travel Screening: Not Applicable

## 2023-06-13 ENCOUNTER — MEDICAL CORRESPONDENCE (OUTPATIENT)
Dept: HEALTH INFORMATION MANAGEMENT | Facility: CLINIC | Age: 23
End: 2023-06-13

## 2023-06-26 ENCOUNTER — MEDICAL CORRESPONDENCE (OUTPATIENT)
Dept: HEALTH INFORMATION MANAGEMENT | Facility: CLINIC | Age: 23
End: 2023-06-26
Payer: COMMERCIAL

## 2023-07-03 ENCOUNTER — TRANSFERRED RECORDS (OUTPATIENT)
Dept: HEALTH INFORMATION MANAGEMENT | Facility: CLINIC | Age: 23
End: 2023-07-03
Payer: COMMERCIAL

## 2023-07-03 LAB — RETINOPATHY: NEGATIVE

## 2023-08-21 ENCOUNTER — VIRTUAL VISIT (OUTPATIENT)
Dept: EDUCATION SERVICES | Facility: CLINIC | Age: 23
End: 2023-08-21
Payer: COMMERCIAL

## 2023-08-21 VITALS — WEIGHT: 185 LBS | BODY MASS INDEX: 28.98 KG/M2

## 2023-08-21 DIAGNOSIS — E10.65 UNCONTROLLED TYPE 1 DIABETES MELLITUS WITH HYPERGLYCEMIA (H): Primary | ICD-10-CM

## 2023-08-21 PROCEDURE — G0108 DIAB MANAGE TRN  PER INDIV: HCPCS | Mod: VID

## 2023-08-21 NOTE — PATIENT INSTRUCTIONS
PLAN:  *We changed carb ratio all day to 1:9  *Changed 12pm and 4pm correction to 45  *Aim for 30 grams of carbs with meals  *Enter all carbs  *Try to only have a few sips of gatorade when in low 100's with double arrows down to prevent from going high. 15-15 rule  *Schedule appt with primary care doctor  *Clinic coordinators will reach out to schedule follow up with Hever    FOLLOW-UP:    *9/22 Virtual Visit with Bonnie

## 2023-08-21 NOTE — NURSING NOTE
Is the patient currently in the state of [place]? YES    Visit mode:VIDEO    If the visit is dropped, the patient can be reconnected by: VIDEO VISIT: Text to cell phone:   Telephone Information:   Mobile [phone]       Will anyone else be joining the visit? NO  (If patient encounters technical issues they should call [phone] :[phone])    How would you like to obtain your AVS? MyChart    Are changes needed to the allergy or medication list? No    Reason for visit: RECHECK and Video Visit    Alexandria RAINES

## 2023-08-21 NOTE — PROGRESS NOTES
Virtual Visit Details    Type of service:  Video Visit     Originating Location (pt. Location): Home    Distant Location (provider location):  On-site  Platform used for Video Visit: [employer]      Diabetes Self-Management Education & Support    Jerrell Hernandez presents today for education related to Type 1 diabetes    Patient is being treated with:  insulin and insulin pump  She is accompanied by self    Year of diagnosis: 2009  Referring provider:  Abraham  Living Situation: Home  Employment: Grad School    PATIENT CONCERNS RELATED TO DIABETES SELF MANAGEMENT:   Lows, fluctuations      ASSESSMENT:    Taking Medication:     Current Diabetes Management per Patient:  Taking diabetes medications? Humalog    Monitoring  Patient glucose self monitoring as follows: continuously using a continuous glucose monitor (CGM)  BG meter: Dexcom   meter  BG results: Only have 3 days of data           Patient's most recent   Lab Results   Component Value Date    A1C 8.2 02/20/2023    A1C 10.0 12/15/2016      Patient's A1C goal: <8.0    Activity: not assessed    Healthy Eating:  Very inconsistent, struggling with appetite suppression/fullness     Problem Solving:      Patient is at risk of hypoglycemia?: YES, treats with Gatorade, Juice  Hospitalizations for hyper or hypoglycemia: No    Healthy Coping and Stress Management:   Sources of stress identified by patient: New job, Grad School  Coping mechanisms identified by patient:  Other (please specify):  Time with family    EDUCATION and INSTRUCTION PROVIDED AT THIS VISIT:    T1 Virtual Visit to discuss low blood sugars. She has been on Tandem since late May. Overall likes pump. She is having trouble with cell phone; t connect data limited as I can only see the past 3 days of data. She is in CIQ 92% of time, TIR 48%, TAR 47%, with 5% lows. Basal/Bolus 15.44/3.69 units. Lows likely due to over estimating carbs, she will over treat lows and then has to take insulin for highs. She  doesn't always remember to enter carbs. She denies concern of weight gain from insulin. She feels correction and ICR need adjusting. We weakened ICR all day to 1:9 and ISF at 12pm-12am to 45. She has been struggling with appetite suppression/fullness, does not eat regularly. We discussed eating more consistently, aiming for 30 grams with each meal. Follow up in one month.     Patient-stated goal written and given to Jerrell Hernandez.  Verbalized and demonstrated understanding of instructions.   See patient instructions  AVS printed and given to patient-via My Chart    PLAN:  *We changed carb ratio all day to 1:9  *Changed 12pm and 4pm correction to 45  *Aim for 30 grams of carbs with meals  *Enter all carbs  *Try to only have a few sips of gatorade when in low 100's with double arrows down to prevent from going high. 15-15 rule  *Schedule appt with primary care doctor  *Clinic coordinators will reach out to schedule follow up with Hever    FOLLOW-UP:    *9/22 Virtual Visit with Bonnie    Time spent with patient at today's visit was 47 minutes.      Any diabetes medication dose changes were made via the CDE Protocol and Collaborative Practice Agreement with Julio Cesar and  [place].  A copy of this encounter was provided to patient's referring provider.

## 2023-08-29 DIAGNOSIS — E10.65 UNCONTROLLED TYPE 1 DIABETES MELLITUS WITH HYPERGLYCEMIA (H): ICD-10-CM

## 2023-08-29 RX ORDER — PROCHLORPERAZINE 25 MG/1
SUPPOSITORY RECTAL
Qty: 1 EACH | Refills: 3 | Status: SHIPPED | OUTPATIENT
Start: 2023-08-29 | End: 2024-07-13

## 2023-08-31 ENCOUNTER — TELEPHONE (OUTPATIENT)
Dept: ENDOCRINOLOGY | Facility: CLINIC | Age: 23
End: 2023-08-31
Payer: COMMERCIAL

## 2023-09-10 ENCOUNTER — HEALTH MAINTENANCE LETTER (OUTPATIENT)
Age: 23
End: 2023-09-10

## 2023-11-14 ENCOUNTER — TELEPHONE (OUTPATIENT)
Dept: ENDOCRINOLOGY | Facility: CLINIC | Age: 23
End: 2023-11-14
Payer: COMMERCIAL

## 2023-11-14 NOTE — TELEPHONE ENCOUNTER
PA Initiation    Medication: DEXCOM G6 TRANSMITTER MISC  Insurance Company: [employer] ([facility]) - Phone [phone] Fax [phone]  Pharmacy Filling the Rx: [facility]/PHARMACY #[id number] - [place] [address]  Filling Pharmacy Phone:    Filling Pharmacy Fax:    Start Date: 11/14/2023    Key: [id number]

## 2023-11-16 NOTE — TELEPHONE ENCOUNTER
Prior Authorization Approval    Medication: DEXCOM G6 TRANSMITTER MISC  Authorization Effective Date: 11/14/2023  Authorization Expiration Date: 11/14/2024  Approved Dose/Quantity: 1 per 90 days  Reference #: Key: [id number]   Insurance Company: [employer] ([facility]) - Phone [phone] Fax [phone]  Expected CoPay: $    CoPay Card Available:      Financial Assistance Needed: no  Which Pharmacy is filling the prescription: [facility]/PHARMACY #[id number] - [place] - [address] [place] [address]  Pharmacy Notified: yes  Patient Notified: no

## 2023-12-01 ENCOUNTER — TELEPHONE (OUTPATIENT)
Dept: ENDOCRINOLOGY | Facility: CLINIC | Age: 23
End: 2023-12-01
Payer: COMMERCIAL

## 2023-12-01 PROCEDURE — 99207 PR NO BILLABLE SERVICE THIS VISIT: CPT | Performed by: PHYSICIAN ASSISTANT

## 2023-12-01 NOTE — TELEPHONE ENCOUNTER
Spoke with patient. She is feeling symptoms of DKA such as upset stomach, nausea, fatigue and shortness of breath, heaviness in the chest. She has been hospitalized for DKA in the past and is familiar with symptoms and how to treat. Usually she takes correction insulin and increases hydration. She has tried to increase hydration and ketones remain large/moderate. Ketone strips are not . Patient has had a lower appetite lately but has not been fasting or intentionally eating low carb. 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 Suspect euglycemic DKA. Patient agreed to go to the ER for assessment. She has a friend who can assist her.

## 2023-12-01 NOTE — TELEPHONE ENCOUNTER
[facility] Call Center    Phone Message    May a detailed message be left on voicemail: yes     Reason for Call: Symptoms or Concerns     If patient has red-flag symptoms, warm transfer to triage line    Current symptom or concern: ketones with a normal Blood sugar    Symptoms have been present for:  3 week(s)      Are there any new or worsening symptoms? Yes: Pt requesting call back to discuss. Pt stated the ketones have been large and moderate, pt not sure what to do

## 2023-12-01 NOTE — TELEPHONE ENCOUNTER
Patient calling back stating she missed a call because her phone was on silent and to call back again now that she turned up her volume. Mil call and advise.

## 2023-12-03 ENCOUNTER — MYC MEDICAL ADVICE (OUTPATIENT)
Dept: EDUCATION SERVICES | Facility: CLINIC | Age: 23
End: 2023-12-03
Payer: COMMERCIAL

## 2023-12-03 NOTE — LETTER
To Whom It May Concern:    Jerrell has been sick and dealing with issues related to her type 1 diabetes over the last three weeks.  This has interfered with her ability to get her course work done. Please feel free to reach out with any questions.      Cesia Mccauley RN,[address] [address]  Phone: [phone]  [email or web address]

## 2023-12-04 NOTE — TELEPHONE ENCOUNTER
ED Pattie MORATAYA [place] Friday. Lab work normal with exception of elevated Bilirubin (2.2). This is 3rd week she has had off/on ketones. Week before Thanksgiving, seen at PCP for blood in stool. Bilirubin high (2.6) and Colonoscopy recommended.     Current symptoms: Hard to breathe, nauseated, chest discomfort, fatigue. Feels like this is what happens before DKA. Denies vomiting. BG normal unless eats, then goes high.     SG at time of call 186. Ketones small. Trying to push fluids.    Due to chest discomfort and difficulty breathing (did not appear to be in respiratory distress), advised to return to ED for evaluation. Possibly could be stress related; upset, crying during call. Does see therapist. Feels professors aren't understanding of disease.    Will forward to Dr. Georges to review in the absence of Araseli Rosario.    Bonnie Greer, RN, Diabetes Educator  Diabetes Education Department  [facility], [place]  [phone]

## 2023-12-07 NOTE — TELEPHONE ENCOUNTER
Phone call to Jerrell.  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  A letter stating this was sent to her. Cesia Mccauley RN,[facility]

## 2023-12-07 NOTE — TELEPHONE ENCOUNTER
Patient states that she has not been able to view thread and would like a call back or response from DE.

## 2023-12-08 NOTE — TELEPHONE ENCOUNTER
Contacted patient to review.    Patient feeling better today.     Discussed with Dr. Costa in clinic. Labs normal, confirm not on Ketogenic diet. Continue to monitor. Follow up for Colonoscopy.          Bonnie Greer RN, Diabetes Educator  Diabetes Education Department  [facility], [place]  [phone]

## 2024-01-31 DIAGNOSIS — E10.65 UNCONTROLLED TYPE 1 DIABETES MELLITUS WITH HYPERGLYCEMIA (H): ICD-10-CM

## 2024-02-01 ENCOUNTER — MYC REFILL (OUTPATIENT)
Dept: ENDOCRINOLOGY | Facility: CLINIC | Age: 24
End: 2024-02-01
Payer: COMMERCIAL

## 2024-02-01 DIAGNOSIS — E10.65 UNCONTROLLED TYPE 1 DIABETES MELLITUS WITH HYPERGLYCEMIA (H): ICD-10-CM

## 2024-02-01 RX ORDER — PROCHLORPERAZINE 25 MG/1
SUPPOSITORY RECTAL
Qty: 3 EACH | Refills: 0 | OUTPATIENT
Start: 2024-02-01

## 2024-02-01 RX ORDER — PROCHLORPERAZINE 25 MG/1
SUPPOSITORY RECTAL
Qty: 9 EACH | Refills: 3 | Status: CANCELLED | OUTPATIENT
Start: 2024-02-01

## 2024-02-02 ENCOUNTER — TELEPHONE (OUTPATIENT)
Dept: ENDOCRINOLOGY | Facility: CLINIC | Age: 24
End: 2024-02-02
Payer: COMMERCIAL

## 2024-02-02 DIAGNOSIS — E10.65 UNCONTROLLED TYPE 1 DIABETES MELLITUS WITH HYPERGLYCEMIA (H): ICD-10-CM

## 2024-02-02 RX ORDER — PROCHLORPERAZINE 25 MG/1
SUPPOSITORY RECTAL
Qty: 3 EACH | Refills: 0 | Status: SHIPPED | OUTPATIENT
Start: 2024-02-02 | End: 2024-02-29

## 2024-02-02 NOTE — TELEPHONE ENCOUNTER
Verbal provided for Dexcom sensor refill directly to pharmacy.   Claudia Taylor RN on 2/2/2024 at 3:44 PM

## 2024-02-15 NOTE — PROGRESS NOTES
Outcome for 02/15/24 12:58 PM :Sent patient [employer] message asking them to upload their BG data  Stacey Hampton

## 2024-02-20 ENCOUNTER — OFFICE VISIT (OUTPATIENT)
Dept: ENDOCRINOLOGY | Facility: CLINIC | Age: 24
End: 2024-02-20
Payer: COMMERCIAL

## 2024-02-20 ENCOUNTER — LAB (OUTPATIENT)
Dept: LAB | Facility: CLINIC | Age: 24
End: 2024-02-20
Payer: COMMERCIAL

## 2024-02-20 VITALS
BODY MASS INDEX: 30.23 KG/M2 | SYSTOLIC BLOOD PRESSURE: 127 MMHG | HEART RATE: 99 BPM | OXYGEN SATURATION: 100 % | DIASTOLIC BLOOD PRESSURE: 76 MMHG | WEIGHT: 193 LBS

## 2024-02-20 DIAGNOSIS — E10.65 TYPE 1 DIABETES MELLITUS WITH HYPERGLYCEMIA (H): Primary | ICD-10-CM

## 2024-02-20 DIAGNOSIS — E10.65 TYPE 1 DIABETES MELLITUS WITH HYPERGLYCEMIA (H): ICD-10-CM

## 2024-02-20 LAB — HBA1C MFR BLD: 7 %

## 2024-02-20 PROCEDURE — 86364 TISS TRNSGLTMNASE EA IG CLAS: CPT | Performed by: PHYSICIAN ASSISTANT

## 2024-02-20 PROCEDURE — 99000 SPECIMEN HANDLING OFFICE-LAB: CPT | Performed by: PATHOLOGY

## 2024-02-20 PROCEDURE — 99215 OFFICE O/P EST HI 40 MIN: CPT | Performed by: PHYSICIAN ASSISTANT

## 2024-02-20 PROCEDURE — 83036 HEMOGLOBIN GLYCOSYLATED A1C: CPT | Performed by: PATHOLOGY

## 2024-02-20 PROCEDURE — 36415 COLL VENOUS BLD VENIPUNCTURE: CPT | Performed by: PATHOLOGY

## 2024-02-20 RX ORDER — VERAPAMIL HYDROCHLORIDE 120 MG/1
120 TABLET, FILM COATED, EXTENDED RELEASE ORAL
COMMUNITY
Start: 2023-04-25

## 2024-02-20 RX ORDER — RIZATRIPTAN BENZOATE 10 MG/1
10 TABLET ORAL
COMMUNITY
Start: 2023-04-18

## 2024-02-20 ASSESSMENT — PAIN SCALES - GENERAL: PAINLEVEL: NO PAIN (0)

## 2024-02-20 NOTE — NURSING NOTE
Chief Complaint   Patient presents with    Diabetes     Blood pressure 127/76, pulse 99, weight 87.5 kg (193 lb), SpO2 100%.    Stacey Hampton

## 2024-02-20 NOTE — LETTER
2/20/2024       RE: Jerrell Hernandez  [address] [place] [address]     Dear Colleague,    Thank you for referring your patient, Jerrell Hernandez, to the [facility] [place] at [facility]. Please see a copy of my visit note below.    Outcome for 02/15/24 12:58 PM :Sent patient [employer] message asking them to upload their BG data  Stacey MURRAY  Jerrell Hernandez is a 24 year old female with type 1 diabetes mellitus.  Video visit for diabetes follow up today.  Pt last seen in Feb 2023.  Pt gives a hx of type 1 diabetes mellitus dx at age 9.  She was told she has mild retinopathy and is seen by her Oph staff on a regular basis.  She denies any known hx of nephropathy or neuropathy.  She was admitted in January 2022 and April 2022 with DKA. She had been drinking ETOH.  Only other medical hx includes hx of compartment syndrome and s/p appy.  For her diabetes, she is using a Tandem insulin pump-control IQ and DexcomG6 sensor.  Current pump settings are below.  Basal insulin rates:  Midnight= 1.2 units/hr  Noon = 1.5 units/hr.  4 pm = 1.5 units/hr.  I/C ratio is 1:7 x 24 hrs.  CF 50 at midnight and 40 at noon.  Pt's A1C is 7.0 % today.  Pt's A1C was 8.2 % on 2/20/2023.   Previous A1C was 8.1 % in Nov 2022.  I reviewed her insulin pump and sensor data today.  Her blood sugar is in target 56 % of the time with 1 % lows.  Her I/C ratio may need to be more aggressive during the day and evening.   On ROS today, fatigue and some SOB since COVID. No fevers or chills at this time.   Hx of migraine and cluster headaches and nausea with headaches.  Weight gain.  Abdominal bloating and loose stools. Mild nausea.  She denies blurred vision, dysuria or hematuria.  No sx of neuropathy or foot ulcers.   Pt has an IUD.    Diabetes Care  Retinopathy:mild retinopathy per patient; she has seen Oph in 2023.  Nephropathy:none; urine  microalbuminuria negative in July 2022.  Neuropathy:none.  Foot Exam:no ulcers.  Taking aspirin:no.  Lipids::LDL 79 in 3/2018.  Insulin : Tandem insulin pump- control IQ.  Testing: DexcomG6 sensor.  Hypoglycemia tx: pt has Baqsimi nasal glucagon spray to use in case of severe hypoglycemia.            ROS  See under HPI.    Allergies  Allergies   Allergen Reactions    Amoxicillin Hives     Age of reaction/How long ago was it? childhood  Have they had the medication since? no      Azithromycin Hives       Medications  Current Outpatient Medications   Medication Sig Dispense Refill    acetone urine (KETOSTIX) test strip Patient to test when blood glucose is >300x2 or when sick and vomiting. 50 strip 3    Continuous Blood Gluc  (DEXCOM G6 ) RUSTAM Use to read blood sugars as per 's instructions. 1 each 0    Continuous Blood Gluc Sensor (DEXCOM G6 SENSOR) MISC USE AS DIRECTED CHANGE EVERY 10 DAYS 3 each 0    Continuous Blood Gluc Transmit (DEXCOM G6 TRANSMITTER) MISC CHANGE EVERY 90 DAYS 1 each 3    Glucagon (BAQSIMI ONE PACK) 3 MG/DOSE POWD Spray 1 spray (3 mg) in nostril See Admin Instructions USE ONLY FOR SEVERE HYPOGLYCEMIA. 1 each 3    HUMALOG KWIKPEN 100 UNIT/ML soln Inject 1 unit/5 gms CHO with meal with correction. Pt uses approx 45 units daily. 60 mL 3    insulin glargine (LANTUS SOLOSTAR) 100 UNIT/ML pen INJECT 36 UNITS SUBCUTANEOUS DAILY. IN CASE OF PUMP FAILURE ONLY. 45 mL 0    insulin lispro (HUMALOG VIAL) 100 UNIT/ML vial Uses up to 100 units per day in insulin pump for basal, bolus, and priming of insulin pump tubing. 100 mL 1    insulin pen needle (BD OPAL U/F) 32G X 4 MM miscellaneous Use 3-4 daily. 300 each 3    insulin syringes, disposable, U-100 0.3 ML MISC Use only for insulin injection in case insulin pump fails. 25 each 1    KETOSTIX test strip Use up to four times daily as needed 50 strip 1    rizatriptan (MAXALT) 10 MG tablet Take 10 mg by mouth      verapamil ER  (CALAN-SR) 120 MG CR tablet Take 120 mg by mouth      Wound Dressing Adhesive (MASTISOL ADHESIVE) LIQD Use every 3 days with infusion set change or as needed 15 mL 1    blood glucose monitoring (ARLINE CONTOUR NEXT) test strip Use to test blood sugar 13 times daily during dance season. (Patient not taking: Reported on 2/20/2024) 1200 each 4    blood glucose monitoring (ONETOUCH ULTRA) test strip Patient tests 8 times/day (Patient not taking: Reported on 8/21/2023) 720 each 3       Family History  family history includes Diabetes in an other family member; Thyroid Disease in her paternal grandmother.    Social History   reports that she has never smoked. She has never used smokeless tobacco. She reports current alcohol use. She reports that she does not use drugs.     Past Medical History  Past Medical History:   Diagnosis Date    Chronic generalized abdominal pain     constipation    Type I (juvenile type) diabetes mellitus without mention of complication, uncontrolled 2009    previosuly followed at [facility]       Past Surgical History:   Procedure Laterality Date    ADENOIDECTOMY      APPENDECTOMY  2009    SINUS SURGERY         Physical Exam    /76 (BP Location: Right arm, Patient Position: Sitting, Cuff Size: Adult Large)   Pulse 99   Wt 87.5 kg (193 lb)   SpO2 100%   BMI 30.23 kg/m         RESULTS  Creatinine   Date Value Ref Range Status   02/20/2023 1.07 (H) 0.51 - 0.95 mg/dL Final   05/12/2019 0.68 0.52 - 1.04 mg/dL Final     GFR Estimate   Date Value Ref Range Status   02/20/2023 74 >60 mL/min/1.73m2 Final     Comment:     eGFR calculated using 2021 CKD-EPI equation.   05/12/2019 >90 >60 mL/min/[1.73_m2] Final     Comment:     Non  GFR Calc  Starting 12/18/2018, serum creatinine based estimated GFR (eGFR) will be   calculated using the Chronic Kidney Disease Epidemiology Collaboration   (CKD-EPI) equation.       Hemoglobin A1C   Date Value Ref Range Status   02/20/2023 8.2 (H) 0.0 -  5.6 % Final     Comment:     Normal <5.7%   Prediabetes 5.7-6.4%    Diabetes 6.5% or higher     Note: Adopted from ADA consensus guidelines.   12/15/2016 10.0 % Final     Potassium   Date Value Ref Range Status   11/22/2022 4.6 3.4 - 5.3 mmol/L Final   05/12/2019 3.6 3.4 - 5.3 mmol/L Final     ALT   Date Value Ref Range Status   11/22/2022 10 10 - 35 U/L Final   05/12/2019 19 0 - 50 U/L Final     AST   Date Value Ref Range Status   11/22/2022 13 10 - 35 U/L Final   05/12/2019 7 0 - 45 U/L Final     TSH   Date Value Ref Range Status   11/22/2022 3.05 0.30 - 4.20 uIU/mL Final   07/12/2022 3.74 0.40 - 4.00 mU/L Final   12/26/2019 1.29 0.40 - 4.00 mU/L Final       Cholesterol   Date Value Ref Range Status   03/30/2018 132 <170 mg/dL Final   12/04/2014 154 <170 mg/dL Final     Comment:     LDL Cholesterol is the primary guide to therapy.   The NCEP recommends further evaluation of: patients with cholesterol greater   than 200 mg/dL if additional risk factors are present, cholesterol greater   than   240 mg/dL, triglycerides greater than 150 mg/dL, or HDL less than 40 mg/dL.       HDL Cholesterol   Date Value Ref Range Status   03/30/2018 39 (L) >45 mg/dL Final     Comment:     Low:             <40 mg/dl  Borderline low:   40-45 mg/dl     12/04/2014 33 (L) >45 mg/dL Final     LDL Cholesterol Calculated   Date Value Ref Range Status   03/30/2018 79 <110 mg/dL Final   12/04/2014 78 0 - 129 mg/dL Final     Comment:     LDL Cholesterol is the primary guide to therapy: LDL-cholesterol goal in high   risk patients is <100 mg/dL and in very high risk patients is <70 mg/dL.       Triglycerides   Date Value Ref Range Status   03/30/2018 70 <90 mg/dL Final   12/04/2014 217 (H) 0 - 150 mg/dL Final     Cholesterol/HDL Ratio   Date Value Ref Range Status   12/04/2014 4.7 0.0 - 5.0 Final   10/25/2012 4.6 0.0 - 5.0 Final         ASSESSMENT/PLAN:    1.  TYPE 1 DIABETES MELLITUS: Type 1 diabetes mellitus complicated by retinopathy.  Pt's  blood sugar control has improved since using a Tandem insulin pump.  If she continue to see high blood sugars postprandial during the day and evening she was instructed to change her I/C ratio to 1:6 from 1:7 at both noon and 4 pm.  Pt denies sx of neuropathy. No foot ulcers.  Her urine microalbuminuria was negative in 7/2022. Creat 0.71 with GFR > 90  mL/min in 12/2023.  /76 today.    2.  RETINOPATHY: Pt seen by Oph.    3.  HX OF INTERMITTENT LOOSE STOOLS: Labs for celiac negative. She also has abdominal bloating some nausea.  Gastric emptying study ordered today.    4.  MENTAL STATUS: Denies thoughts of self harm at this time.  She is seeing a therapist.    5. HEALTH MAINTENANCE: Pt to see PCP.     6.  FOLLOW UP: with me in 4 months.  Labs today.  Gastric emptying study ordered today.    Time spent reviewing chart, labs, DexcomG6 sensor and Tandem insulin pump download data today = 5 minutes.  Time for  clinic visit today  = 25  minutes.  Time for documentation today = 15 minutes.    TOTAL TIME FOR VISIT TODAY =  45  minutes.    Corina Rosario PA-C

## 2024-02-21 LAB — TTG IGA SER-ACNC: 0.4 U/ML

## 2024-02-21 NOTE — PROGRESS NOTES
HPI  Jerrell Hernandez is a 24 year old female with type 1 diabetes mellitus.  Video visit for diabetes follow up today.  Pt last seen in Feb 2023.  Pt gives a hx of type 1 diabetes mellitus dx at age 9.  She was told she has mild retinopathy and is seen by her Oph staff on a regular basis.  She denies any known hx of nephropathy or neuropathy.  She was admitted in January 2022 and April 2022 with DKA. She had been drinking ETOH.  Only other medical hx includes hx of compartment syndrome and s/p appy.  For her diabetes, she is using a Tandem insulin pump-control IQ and DexcomG6 sensor.  Current pump settings are below.  Basal insulin rates:  Midnight= 1.2 units/hr  Noon = 1.5 units/hr.  4 pm = 1.5 units/hr.  I/C ratio is 1:7 x 24 hrs.  CF 50 at midnight and 40 at noon.  Pt's A1C is 7.0 % today.  Pt's A1C was 8.2 % on 2/20/2023.   Previous A1C was 8.1 % in Nov 2022.  I reviewed her insulin pump and sensor data today.  Her blood sugar is in target 56 % of the time with 1 % lows.  Her I/C ratio may need to be more aggressive during the day and evening.   On ROS today, fatigue and some SOB since COVID. No fevers or chills at this time.   Hx of migraine and cluster headaches and nausea with headaches.  Weight gain.  Abdominal bloating and loose stools. Mild nausea.  She denies blurred vision, dysuria or hematuria.  No sx of neuropathy or foot ulcers.   Pt has an IUD.    Diabetes Care  Retinopathy:mild retinopathy per patient; she has seen Oph in 2023.  Nephropathy:none; urine microalbuminuria negative in July 2022.  Neuropathy:none.  Foot Exam:no ulcers.  Taking aspirin:no.  Lipids::LDL 79 in 3/2018.  Insulin : Tandem insulin pump- control IQ.  Testing: DexcomG6 sensor.  Hypoglycemia tx: pt has Baqsimi nasal glucagon spray to use in case of severe hypoglycemia.            ROS  See under HPI.    Allergies  Allergies   Allergen Reactions    Amoxicillin Hives     Age of reaction/How long ago was it? childhood  Have they had  the medication since? no      Azithromycin Hives       Medications  Current Outpatient Medications   Medication Sig Dispense Refill    acetone urine (KETOSTIX) test strip Patient to test when blood glucose is >300x2 or when sick and vomiting. 50 strip 3    Continuous Blood Gluc  (DEXCOM G6 ) RUSTAM Use to read blood sugars as per 's instructions. 1 each 0    Continuous Blood Gluc Sensor (DEXCOM G6 SENSOR) MISC USE AS DIRECTED CHANGE EVERY 10 DAYS 3 each 0    Continuous Blood Gluc Transmit (DEXCOM G6 TRANSMITTER) MISC CHANGE EVERY 90 DAYS 1 each 3    Glucagon (BAQSIMI ONE PACK) 3 MG/DOSE POWD Spray 1 spray (3 mg) in nostril See Admin Instructions USE ONLY FOR SEVERE HYPOGLYCEMIA. 1 each 3    HUMALOG KWIKPEN 100 UNIT/ML soln Inject 1 unit/5 gms CHO with meal with correction. Pt uses approx 45 units daily. 60 mL 3    insulin glargine (LANTUS SOLOSTAR) 100 UNIT/ML pen INJECT 36 UNITS SUBCUTANEOUS DAILY. IN CASE OF PUMP FAILURE ONLY. 45 mL 0    insulin lispro (HUMALOG VIAL) 100 UNIT/ML vial Uses up to 100 units per day in insulin pump for basal, bolus, and priming of insulin pump tubing. 100 mL 1    insulin pen needle (BD OPAL U/F) 32G X 4 MM miscellaneous Use 3-4 daily. 300 each 3    insulin syringes, disposable, U-100 0.3 ML MISC Use only for insulin injection in case insulin pump fails. 25 each 1    KETOSTIX test strip Use up to four times daily as needed 50 strip 1    rizatriptan (MAXALT) 10 MG tablet Take 10 mg by mouth      verapamil ER (CALAN-SR) 120 MG CR tablet Take 120 mg by mouth      Wound Dressing Adhesive (MASTISOL ADHESIVE) LIQD Use every 3 days with infusion set change or as needed 15 mL 1    blood glucose monitoring (ARLINE CONTOUR NEXT) test strip Use to test blood sugar 13 times daily during dance season. (Patient not taking: Reported on 2/20/2024) 1200 each 4    blood glucose monitoring (ONETOUCH ULTRA) test strip Patient tests 8 times/day (Patient not taking: Reported on  8/21/2023) 720 each 3       Family History  family history includes Diabetes in an other family member; Thyroid Disease in her paternal grandmother.    Social History   reports that she has never smoked. She has never used smokeless tobacco. She reports current alcohol use. She reports that she does not use drugs.     Past Medical History  Past Medical History:   Diagnosis Date    Chronic generalized abdominal pain     constipation    Type I (juvenile type) diabetes mellitus without mention of complication, uncontrolled 2009    previosuly followed at [facility]       Past Surgical History:   Procedure Laterality Date    ADENOIDECTOMY      APPENDECTOMY  2009    SINUS SURGERY         Physical Exam    /76 (BP Location: Right arm, Patient Position: Sitting, Cuff Size: Adult Large)   Pulse 99   Wt 87.5 kg (193 lb)   SpO2 100%   BMI 30.23 kg/m         RESULTS  Creatinine   Date Value Ref Range Status   02/20/2023 1.07 (H) 0.51 - 0.95 mg/dL Final   05/12/2019 0.68 0.52 - 1.04 mg/dL Final     GFR Estimate   Date Value Ref Range Status   02/20/2023 74 >60 mL/min/1.73m2 Final     Comment:     eGFR calculated using 2021 CKD-EPI equation.   05/12/2019 >90 >60 mL/min/[1.73_m2] Final     Comment:     Non  GFR Calc  Starting 12/18/2018, serum creatinine based estimated GFR (eGFR) will be   calculated using the Chronic Kidney Disease Epidemiology Collaboration   (CKD-EPI) equation.       Hemoglobin A1C   Date Value Ref Range Status   02/20/2023 8.2 (H) 0.0 - 5.6 % Final     Comment:     Normal <5.7%   Prediabetes 5.7-6.4%    Diabetes 6.5% or higher     Note: Adopted from ADA consensus guidelines.   12/15/2016 10.0 % Final     Potassium   Date Value Ref Range Status   11/22/2022 4.6 3.4 - 5.3 mmol/L Final   05/12/2019 3.6 3.4 - 5.3 mmol/L Final     ALT   Date Value Ref Range Status   11/22/2022 10 10 - 35 U/L Final   05/12/2019 19 0 - 50 U/L Final     AST   Date Value Ref Range Status   11/22/2022 13 10 - 35  U/L Final   05/12/2019 7 0 - 45 U/L Final     TSH   Date Value Ref Range Status   11/22/2022 3.05 0.30 - 4.20 uIU/mL Final   07/12/2022 3.74 0.40 - 4.00 mU/L Final   12/26/2019 1.29 0.40 - 4.00 mU/L Final       Cholesterol   Date Value Ref Range Status   03/30/2018 132 <170 mg/dL Final   12/04/2014 154 <170 mg/dL Final     Comment:     LDL Cholesterol is the primary guide to therapy.   The NCEP recommends further evaluation of: patients with cholesterol greater   than 200 mg/dL if additional risk factors are present, cholesterol greater   than   240 mg/dL, triglycerides greater than 150 mg/dL, or HDL less than 40 mg/dL.       HDL Cholesterol   Date Value Ref Range Status   03/30/2018 39 (L) >45 mg/dL Final     Comment:     Low:             <40 mg/dl  Borderline low:   40-45 mg/dl     12/04/2014 33 (L) >45 mg/dL Final     LDL Cholesterol Calculated   Date Value Ref Range Status   03/30/2018 79 <110 mg/dL Final   12/04/2014 78 0 - 129 mg/dL Final     Comment:     LDL Cholesterol is the primary guide to therapy: LDL-cholesterol goal in high   risk patients is <100 mg/dL and in very high risk patients is <70 mg/dL.       Triglycerides   Date Value Ref Range Status   03/30/2018 70 <90 mg/dL Final   12/04/2014 217 (H) 0 - 150 mg/dL Final     Cholesterol/HDL Ratio   Date Value Ref Range Status   12/04/2014 4.7 0.0 - 5.0 Final   10/25/2012 4.6 0.0 - 5.0 Final         ASSESSMENT/PLAN:    1.  TYPE 1 DIABETES MELLITUS: Type 1 diabetes mellitus complicated by retinopathy.  Pt's blood sugar control has improved since using a Tandem insulin pump.  If she continue to see high blood sugars postprandial during the day and evening she was instructed to change her I/C ratio to 1:6 from 1:7 at both noon and 4 pm.  Pt denies sx of neuropathy. No foot ulcers.  Her urine microalbuminuria was negative in 7/2022. Creat 0.71 with GFR > 90  mL/min in 12/2023.  /76 today.    2.  RETINOPATHY: Pt seen by Oph.    3.  HX OF INTERMITTENT  LOOSE STOOLS: Labs for celiac negative. She also has abdominal bloating some nausea.  Gastric emptying study ordered today.    4.  MENTAL STATUS: Denies thoughts of self harm at this time.  She is seeing a therapist.    5. HEALTH MAINTENANCE: Pt to see PCP.     6.  FOLLOW UP: with me in 4 months.  Labs today.  Gastric emptying study ordered today.    Time spent reviewing chart, labs, DexcomG6 sensor and Tandem insulin pump download data today = 5 minutes.  Time for  clinic visit today  = 25  minutes.  Time for documentation today = 15 minutes.    TOTAL TIME FOR VISIT TODAY =  45  minutes.    Corina Rosario PA-C

## 2024-02-29 ENCOUNTER — MYC REFILL (OUTPATIENT)
Dept: ENDOCRINOLOGY | Facility: CLINIC | Age: 24
End: 2024-02-29
Payer: COMMERCIAL

## 2024-02-29 DIAGNOSIS — E10.65 UNCONTROLLED TYPE 1 DIABETES MELLITUS WITH HYPERGLYCEMIA (H): ICD-10-CM

## 2024-02-29 RX ORDER — PROCHLORPERAZINE 25 MG/1
SUPPOSITORY RECTAL
Qty: 3 EACH | Refills: 11 | OUTPATIENT
Start: 2024-02-29 | End: 2024-02-29

## 2024-02-29 RX ORDER — PROCHLORPERAZINE 25 MG/1
SUPPOSITORY RECTAL
Qty: 3 EACH | Refills: 11 | Status: SHIPPED | OUTPATIENT
Start: 2024-02-29 | End: 2024-07-15

## 2024-03-01 ENCOUNTER — HOSPITAL ENCOUNTER (OUTPATIENT)
Dept: NUCLEAR MEDICINE | Facility: CLINIC | Age: 24
Setting detail: NUCLEAR MEDICINE
Discharge: HOME | End: 2024-03-01
Attending: PHYSICIAN ASSISTANT | Admitting: PHYSICIAN ASSISTANT
Payer: COMMERCIAL

## 2024-03-01 DIAGNOSIS — E10.65 TYPE 1 DIABETES MELLITUS WITH HYPERGLYCEMIA (H): ICD-10-CM

## 2024-03-01 PROCEDURE — 78264 GASTRIC EMPTYING IMG STUDY: CPT | Mod: 26 | Performed by: RADIOLOGY

## 2024-03-01 PROCEDURE — A9541 TC99M SULFUR COLLOID: HCPCS | Performed by: PHYSICIAN ASSISTANT

## 2024-03-01 PROCEDURE — 78264 GASTRIC EMPTYING IMG STUDY: CPT

## 2024-03-01 PROCEDURE — 343N000001 HC RX 343: Performed by: PHYSICIAN ASSISTANT

## 2024-03-01 RX ADMIN — Medication 2 MILLICURIE: at 08:27

## 2024-03-05 ENCOUNTER — TELEPHONE (OUTPATIENT)
Dept: ENDOCRINOLOGY | Facility: CLINIC | Age: 24
End: 2024-03-05
Payer: COMMERCIAL

## 2024-03-05 NOTE — TELEPHONE ENCOUNTER
Patient call:     Appointment type: return diabetes   Provider: Abraham   Return date: r/s 6/25   Speciality phone number: [phone]  Additional appointment(s) needed:   Additional notes: VM full, sent myc and letter x1     Rut Valdez on 3/5/2024 at 1:19 PM

## 2024-04-25 DIAGNOSIS — E10.65 UNCONTROLLED TYPE 1 DIABETES MELLITUS WITH HYPERGLYCEMIA (H): ICD-10-CM

## 2024-04-25 RX ORDER — INSULIN LISPRO 100 [IU]/ML
INJECTION, SOLUTION INTRAVENOUS; SUBCUTANEOUS
Qty: 100 ML | Refills: 3 | Status: SHIPPED | OUTPATIENT
Start: 2024-04-25 | End: 2024-08-22

## 2024-04-25 NOTE — TELEPHONE ENCOUNTER
Insulin Protocol [id number]/25/2024 12:41 PM   Protocol Details Has GFR on file in past 12 months and most recent value is normal

## 2024-05-03 ENCOUNTER — VIRTUAL VISIT (OUTPATIENT)
Dept: ENDOCRINOLOGY | Facility: CLINIC | Age: 24
End: 2024-05-03
Payer: COMMERCIAL

## 2024-05-03 ENCOUNTER — TELEPHONE (OUTPATIENT)
Dept: ENDOCRINOLOGY | Facility: CLINIC | Age: 24
End: 2024-05-03
Payer: COMMERCIAL

## 2024-05-03 DIAGNOSIS — R53.83 OTHER FATIGUE: ICD-10-CM

## 2024-05-03 DIAGNOSIS — E10.65 TYPE 1 DIABETES MELLITUS WITH HYPERGLYCEMIA (H): Primary | ICD-10-CM

## 2024-05-03 DIAGNOSIS — E06.3 HASHIMOTO'S THYROIDITIS: ICD-10-CM

## 2024-05-03 PROCEDURE — 99215 OFFICE O/P EST HI 40 MIN: CPT | Mod: 95 | Performed by: INTERNAL MEDICINE

## 2024-05-03 NOTE — PROGRESS NOTES
Endocrinology Note         Jerrell is a 24 year old female presents today for .    HPI  Jerrell is a 24 year old female with history of type 1 diabetes and retinopathy presents today for concern of urine ketone presented and fatigue    She has usually been seen by DARIAN Dutta in endocrinology clinic.  Last seen February 2024.  Today patient had concern about urine ketone presented and overall health.    Patient had history of type 1 diabetes since she was 9 years old.  Patient was told to have mild retinopathy but no nephropathy or neuropathy.  He was previously admitted for DKA episode in 2022.    She has been on tandem insulin pump with control IQ and Dexcom sensor.   I have reviewed Dexcom and insulin pump today.   Recent A1c was 7.0 in February 2024.          Her main concern today is urine ketone presented even though she had normal blood sugar for the past few weeks.  She stated she has not been feeling well.  She has been feeling more fatigue, shortness of breath.  Has been checking her urine in the morning and it was positive.  She has no vomiting.  She denies having any infection, URI or UTI symptoms.  She denies drinking any alcohol over the past months.  She feels that she had good hydration.  She has tried to eat about 30 to 40 g of carbohydrate about 3-4 times per day.  Most of a carbohydrate from sandwich or potato or quinoa or rice.  She started feeling unwell over the past year when she has had decreased hunger but she has gained weight.  She had COVID twice.  Last COVID infection was November 2022. She would like to know what else causing her symptoms.    She did have gastric emptying time which was normal 3/2024.  On IUD and has not had menstrual cycle regularly.    Past Medical History  Past Medical History:   Diagnosis Date    Chronic generalized abdominal pain     constipation    Type I (juvenile type) diabetes mellitus without mention of complication, uncontrolled 2009    [facility]       Allergies  Allergies   Allergen Reactions    Amoxicillin Hives     Age of reaction/How long ago was it? childhood  Have they had the medication since? no      Azithromycin Hives     Medications  Current Outpatient Medications   Medication Sig Dispense Refill    acetone urine (KETOSTIX) test strip Patient to test when blood glucose is >300x2 or when sick and vomiting. 50 strip 3    blood glucose monitoring (ARLINE CONTOUR NEXT) test strip Use to test blood sugar 13 times daily during dance season. (Patient not taking: Reported on 2/20/2024) 1200 each 4    blood glucose monitoring (ONETOUCH ULTRA) test strip Patient tests 8 times/day (Patient not taking: Reported on 8/21/2023) 720 each 3    Continuous Blood Gluc  (DEXCOM G6 ) RUSTAM Use to read blood sugars as per 's instructions. 1 each 0    Continuous Blood Gluc Sensor (DEXCOM G6 SENSOR) MISC USE AS DIRECTED CHANGE EVERY 10 DAYS 3 each 11    Continuous Blood Gluc Transmit (DEXCOM G6 TRANSMITTER) MISC CHANGE EVERY 90 DAYS 1 each 3    Glucagon (BAQSIMI ONE PACK) 3 MG/DOSE POWD Spray 1 spray (3 mg) in nostril See Admin Instructions USE ONLY FOR SEVERE HYPOGLYCEMIA. 1 each 3    HUMALOG KWIKPEN 100 UNIT/ML soln Inject 1 unit/5 gms CHO with meal with correction. Pt uses approx 45 units daily. 60 mL 3    insulin glargine (LANTUS SOLOSTAR) 100 UNIT/ML pen INJECT 36 UNITS SUBCUTANEOUS DAILY. IN CASE OF PUMP FAILURE ONLY. 45 mL 0    insulin lispro (HUMALOG VIAL) 100 UNIT/ML vial Uses up to 100 units per day in insulin pump for basal, bolus, and priming of insulin pump tubing. 100 mL 3    insulin pen needle (BD OPAL U/F) 32G X 4 MM miscellaneous Use 3-4 daily. 300 each 3    insulin syringes, disposable, U-100 0.3 ML MISC Use only for insulin injection in case insulin pump fails. 25 each 1    KETOSTIX test strip Use up to four times daily as needed 50 strip 1    rizatriptan (MAXALT) 10 MG tablet Take 10 mg by mouth       verapamil ER (CALAN-SR) 120 MG CR tablet Take 120 mg by mouth      Wound Dressing Adhesive (MASTISOL ADHESIVE) LIQD Use every 3 days with infusion set change or as needed 15 mL 1     Family History  family history includes Diabetes in an other family member; Thyroid Disease in her paternal grandmother.  Social History  Social History     Tobacco Use    Smoking status: Never    Smokeless tobacco: Never   Vaping Use    Vaping status: Some Days    Substances: Nicotine, Flavoring    Devices: Disposable   Substance Use Topics    Alcohol use: Yes     Comment: occ.    Drug use: Never       10 points ROS were negative otherwise mentioned in HPI    Physical Exam  Constitutional: no distress, comfortable, pleasant   Eyes: anicteric  Skin: no jaundice   Neurological: cranial nerves intact  Psychological: appropriate mood     RESULTS  I have personally reviewed labs and images. I also reviewed labs with patient and discussed the result and plan of care.    Lab Results   Component Value Date    A1C 7.0 02/20/2024    A1C 8.2 02/20/2023    A1C 7.9 11/22/2022    A1C 10.0 12/15/2016    A1C 10 08/04/2016    A1C 8.9 05/19/2016    A1C 10.2 02/18/2016    A1C 8.8 09/17/2015      Latest Ref Rng 2/20/2023  4:28 PM   ENDO DIABETES     Creatinine 0.51 - 0.95 mg/dL 1.07 (H)        Latest Ref Rng 7/12/2022  10:23 AM   ENDO DIABETES     Albumin Urine mg/L mg/L 10    Albumin Urine mg/g Cr 0.00 - 25.00 mg/g Cr 5.15         Latest Ref Rng 12/26/2019  4:03 PM 7/12/2022  10:15 AM 11/22/2022  11:22 AM   ENDO THYROID LABS-UMP       TSH 0.40 - 4.00 mU/L 1.29  3.74     TSH 0.30 - 4.20 uIU/mL   3.05    Thyroid Peroxidase Antibody <35 IU/mL  82 (H)        Latest Reference Range & Units 12/15/16 13:31 03/27/19 16:22 07/12/22 10:15   Ferritin 12 - 150 ng/mL 5 (L) 16 27     ASSESSMENT:    Jerrell is a 24 year old female with history of type 1 diabetes and retinopathy presents today for concern of urine ketone presented and fatigue    1) urine ketone presented  and fatigue: patient has not been feeling well for the past few weeks but in fact she reports unwell for more than a year. Main symptoms are fatigue, weight gain, decreased hunger. No recent illness such as URI or UTI. Denied alcohol intake. BG has been been on acceptable range. She has concerns of her symptoms and whether she needs any additional evaluations or not.    Would start with rule out UTI and check for other labs below.  Refer nutritionist to ensure adequate nutritional intake  May consider referral to [facility] clinic if all labs are normal and she continues having symptoms    2) type 1 diabetes with retinopathy: A1c 7.0% in Feb 2024. BGs were in acceptable range. Continue with current pump setting.    PLAN:   -Check TSH, free T4, TPO antibody, a.m. cortisol, ACTH, vitamin D level, CMP, CBC, ferritin  -Refer nutritionist    Joined the call at 5/3/2024, 10:23:09 am.  Left the call at 5/3/2024, 10:43:27 am.  You were on the call for 20 minutes 18 seconds .    External notes/medical records independently reviewed, labs and imaging independently reviewed, medical management and tests to be discussed/communicated to patient.    Time: I spent 46 minutes spent on the date of the encounter preparing to see patient (including chart review and preparation), obtaining and or reviewing additional medical history, performing a physical exam and evaluation, documenting clinical information in the electronic health record, independently interpreting results, communicating results to the patient and coordinating care.    Roberth Ortiz MD  Division of Diabetes and Endocrinology  Department of Medicine

## 2024-05-03 NOTE — TELEPHONE ENCOUNTER
Patient confirmed scheduled appointment:  Date: 5/3  Time: 10:20 am   Visit type: return diabetes   Provider: Roberth   Location: [facility]  Testing/imaging: NA   Additional notes: Spoke to pt and they agreed to take the appt today virtual was okayed per Susanne Colon on 5/3/2024 at 9:57 AM

## 2024-05-03 NOTE — PROGRESS NOTES
Virtual Visit Details    Type of service:  Video Visit     Originating Location (pt. Location): Home    Distant Location (provider location):  Off-site  Platform used for Video Visit: Corina

## 2024-05-03 NOTE — LETTER
5/3/2024       RE: Jerrell Hernandez  [address] [place] [address]     Dear Colleague,    Thank you for referring your patient, Jerrell Hernandez, to the [facility] ENDOCRINOLOG[facility] [place] at [facility]. Please see a copy of my visit note below.    Outcome for 05/03/24 9:59 AM: Data obtained via Dexcom and Tandem website; tandem not up to date- settings pulled  Germaine Candelaria LPN                         Virtual Visit Details    Type of service:  Video Visit     Originating Location (pt. Location): Home    Distant Location (provider location):  Off-site  Platform used for Video Visit: [facility]           Endocrinology Note         Jerrell is a 24 year old female presents today for .    HPI  Jerrell is a 24 year old female with history of type 1 diabetes and retinopathy presents today for concern of urine ketone presented and fatigue    She has usually been seen by DARIAN Dutta in endocrinology clinic.  Last seen February 2024.  Today patient had concern about urine ketone presented and overall health.    Patient had history of type 1 diabetes since she was 9 years old.  Patient was told to have mild retinopathy but no nephropathy or neuropathy.  He was previously admitted for DKA episode in 2022.    She has been on tandem insulin pump with control IQ and Dexcom sensor.   I have reviewed Dexcom and insulin pump today.   Recent A1c was 7.0 in February 2024.          Her main concern today is urine ketone presented even though she had normal blood sugar for the past few weeks.  She stated she has not been feeling well.  She has been feeling more fatigue, shortness of breath.  Has been checking her urine in the morning and it was positive.  She has no vomiting.  She denies having any infection, URI or UTI symptoms.  She denies drinking any alcohol over the past months.  She feels that she had good hydration.  She has tried to eat about 30 to 40 g of  carbohydrate about 3-4 times per day.  Most of a carbohydrate from sandwich or potato or quinoa or rice.  She started feeling unwell over the past year when she has had decreased hunger but she has gained weight.  She had COVID twice.  Last COVID infection was November 2022. She would like to know what else causing her symptoms.    She did have gastric emptying time which was normal 3/2024.  On IUD and has not had menstrual cycle regularly.    Past Medical History  Past Medical History:   Diagnosis Date     Chronic generalized abdominal pain     constipation     Type I (juvenile type) diabetes mellitus without mention of complication, uncontrolled 2009    previosuly followed at [facility]       Allergies  Allergies   Allergen Reactions     Amoxicillin Hives     Age of reaction/How long ago was it? childhood  Have they had the medication since? no       Azithromycin Hives     Medications  Current Outpatient Medications   Medication Sig Dispense Refill     acetone urine (KETOSTIX) test strip Patient to test when blood glucose is >300x2 or when sick and vomiting. 50 strip 3     blood glucose monitoring (ARLINE CONTOUR NEXT) test strip Use to test blood sugar 13 times daily during dance season. (Patient not taking: Reported on 2/20/2024) 1200 each 4     blood glucose monitoring (ONETOUCH ULTRA) test strip Patient tests 8 times/day (Patient not taking: Reported on 8/21/2023) 720 each 3     Continuous Blood Gluc  (DEXCOM G6 ) RUSTAM Use to read blood sugars as per 's instructions. 1 each 0     Continuous Blood Gluc Sensor (DEXCOM G6 SENSOR) MISC USE AS DIRECTED CHANGE EVERY 10 DAYS 3 each 11     Continuous Blood Gluc Transmit (DEXCOM G6 TRANSMITTER) MISC CHANGE EVERY 90 DAYS 1 each 3     Glucagon (BAQSIMI ONE PACK) 3 MG/DOSE POWD Spray 1 spray (3 mg) in nostril See Admin Instructions USE ONLY FOR SEVERE HYPOGLYCEMIA. 1 each 3     HUMALOG KWIKPEN 100 UNIT/ML soln Inject 1 unit/5 gms CHO with meal  with correction. Pt uses approx 45 units daily. 60 mL 3     insulin glargine (LANTUS SOLOSTAR) 100 UNIT/ML pen INJECT 36 UNITS SUBCUTANEOUS DAILY. IN CASE OF PUMP FAILURE ONLY. 45 mL 0     insulin lispro (HUMALOG VIAL) 100 UNIT/ML vial Uses up to 100 units per day in insulin pump for basal, bolus, and priming of insulin pump tubing. 100 mL 3     insulin pen needle (BD OPAL U/F) 32G X 4 MM miscellaneous Use 3-4 daily. 300 each 3     insulin syringes, disposable, U-100 0.3 ML MISC Use only for insulin injection in case insulin pump fails. 25 each 1     KETOSTIX test strip Use up to four times daily as needed 50 strip 1     rizatriptan (MAXALT) 10 MG tablet Take 10 mg by mouth       verapamil ER (CALAN-SR) 120 MG CR tablet Take 120 mg by mouth       Wound Dressing Adhesive (MASTISOL ADHESIVE) LIQD Use every 3 days with infusion set change or as needed 15 mL 1     Family History  family history includes Diabetes in an other family member; Thyroid Disease in her paternal grandmother.  Social History  Social History     Tobacco Use     Smoking status: Never     Smokeless tobacco: Never   Vaping Use     Vaping status: Some Days     Substances: Nicotine, Flavoring     Devices: Disposable   Substance Use Topics     Alcohol use: Yes     Comment: occ.     Drug use: Never       10 points ROS were negative otherwise mentioned in HPI    Physical Exam  Constitutional: no distress, comfortable, pleasant   Eyes: anicteric  Skin: no jaundice   Neurological: cranial nerves intact  Psychological: appropriate mood     RESULTS  I have personally reviewed labs and images. I also reviewed labs with patient and discussed the result and plan of care.    Lab Results   Component Value Date    A1C 7.0 02/20/2024    A1C 8.2 02/20/2023    A1C 7.9 11/22/2022    A1C 10.0 12/15/2016    A1C 10 08/04/2016    A1C 8.9 05/19/2016    A1C 10.2 02/18/2016    A1C 8.8 09/17/2015      Latest Ref Rng 2/20/2023  4:28 PM   ENDO DIABETES     Creatinine 0.51 - 0.95  mg/dL 1.07 (H)        Latest Ref Rng 7/12/2022  10:23 AM   ENDO DIABETES     Albumin Urine mg/L mg/L 10    Albumin Urine mg/g Cr 0.00 - 25.00 mg/g Cr 5.15         Latest Ref Rng 12/26/2019  4:03 PM 7/12/2022  10:15 AM 11/22/2022  11:22 AM   ENDO THYROID LABS-UMP       TSH 0.40 - 4.00 mU/L 1.29  3.74     TSH 0.30 - 4.20 uIU/mL   3.05    Thyroid Peroxidase Antibody <35 IU/mL  82 (H)        Latest Reference Range & Units 12/15/16 13:31 03/27/19 16:22 07/12/22 10:15   Ferritin 12 - 150 ng/mL 5 (L) 16 27     ASSESSMENT:    Jerrell is a 24 year old female with history of type 1 diabetes and retinopathy presents today for concern of urine ketone presented and fatigue    1) urine ketone presented and fatigue: patient has not been feeling well for the past few weeks but in fact she reports unwell for more than a year. Main symptoms are fatigue, weight gain, decreased hunger. No recent illness such as URI or UTI. Denied alcohol intake. BG has been been on acceptable range. She has concerns of her symptoms and whether she needs any additional evaluations or not.    Would start with rule out UTI and check for other labs below.  Refer nutritionist to ensure adequate nutritional intake  May consider referral to long [facility] clinic if all labs are normal and she continues having symptoms    2) type 1 diabetes with retinopathy: A1c 7.0% in Feb 2024. BGs were in acceptable range. Continue with current pump setting.    PLAN:   -Check TSH, free T4, TPO antibody, a.m. cortisol, ACTH, vitamin D level, CMP, CBC, ferritin  -Refer nutritionist    Joined the call at 5/3/2024, 10:23:09 am.  Left the call at 5/3/2024, 10:43:27 am.  You were on the call for 20 minutes 18 seconds .    External notes/medical records independently reviewed, labs and imaging independently reviewed, medical management and tests to be discussed/communicated to patient.    Time: I spent 46 minutes spent on the date of the encounter preparing to see patient (including  chart review and preparation), obtaining and or reviewing additional medical history, performing a physical exam and evaluation, documenting clinical information in the electronic health record, independently interpreting results, communicating results to the patient and coordinating care.    Roberth Ortiz MD  Division of Diabetes and Endocrinology  Department of Medicine

## 2024-05-03 NOTE — NURSING NOTE
Is the patient currently in the state of [place]? YES    Visit mode:VIDEO    If the visit is dropped, the patient can be reconnected by: VIDEO VISIT: Text to cell phone:   Telephone Information:   Mobile [phone]       Will anyone else be joining the visit? NO  (If patient encounters technical issues they should call [phone]    How would you like to obtain your AVS? MyChart    Are changes needed to the allergy or medication list? No    Are refills needed on medications prescribed by this physician? NO    Reason for visit: STEPH RAINES

## 2024-05-03 NOTE — TELEPHONE ENCOUNTER
Patient confirmed scheduled appointment:  Date: 5/10   Time: 7:30 am   Visit type: Return diabetes   Provider: Sha   Location: [facility]  Testing/imaging: NA   Additional notes: Spoke to pt and scheduled next avail w a provider per Araseli's request     Susanne's scheduling request:  She can be scheduled first available return with any attending. There will be one within the week due to cancellations most likely.     Catrina Colon on 5/3/2024 at 8:59 AM

## 2024-05-03 NOTE — PROGRESS NOTES
Outcome for 05/03/24 9:59 AM: Data obtained via Dexcom and Tandem website; tandem not up to date- settings pulled  Germaine Candelaria LPN

## 2024-05-06 ENCOUNTER — LAB (OUTPATIENT)
Dept: LAB | Facility: CLINIC | Age: 24
End: 2024-05-06
Payer: COMMERCIAL

## 2024-05-06 DIAGNOSIS — E06.3 HASHIMOTO'S THYROIDITIS: ICD-10-CM

## 2024-05-06 DIAGNOSIS — E10.65 TYPE 1 DIABETES MELLITUS WITH HYPERGLYCEMIA (H): ICD-10-CM

## 2024-05-06 DIAGNOSIS — R53.83 OTHER FATIGUE: ICD-10-CM

## 2024-05-06 LAB
ALBUMIN SERPL BCG-MCNC: 4.7 G/DL (ref 3.5–5.2)
ALP SERPL-CCNC: 81 U/L (ref 40–150)
ALT SERPL W P-5'-P-CCNC: 15 U/L (ref 0–50)
ANION GAP SERPL CALCULATED.3IONS-SCNC: 12 MMOL/L (ref 7–15)
AST SERPL W P-5'-P-CCNC: 17 U/L (ref 0–45)
BILIRUB SERPL-MCNC: 1.2 MG/DL
BUN SERPL-MCNC: 10.4 MG/DL (ref 6–20)
CALCIUM SERPL-MCNC: 9.6 MG/DL (ref 8.6–10)
CHLORIDE SERPL-SCNC: 104 MMOL/L (ref 98–107)
CORTIS SERPL-MCNC: 17.3 UG/DL
CREAT SERPL-MCNC: 0.82 MG/DL (ref 0.51–0.95)
CREAT UR-MCNC: 231 MG/DL
DEPRECATED HCO3 PLAS-SCNC: 25 MMOL/L (ref 22–29)
EGFRCR SERPLBLD CKD-EPI 2021: >90 ML/MIN/1.73M2
ERYTHROCYTE [DISTWIDTH] IN BLOOD BY AUTOMATED COUNT: 11.7 % (ref 10–15)
FERRITIN SERPL-MCNC: 55 NG/ML (ref 6–175)
GLUCOSE SERPL-MCNC: 153 MG/DL (ref 70–99)
HCT VFR BLD AUTO: 44.1 % (ref 35–47)
HGB BLD-MCNC: 15.2 G/DL (ref 11.7–15.7)
MCH RBC QN AUTO: 28.9 PG (ref 26.5–33)
MCHC RBC AUTO-ENTMCNC: 34.5 G/DL (ref 31.5–36.5)
MCV RBC AUTO: 84 FL (ref 78–100)
MICROALBUMIN UR-MCNC: <12 MG/L
MICROALBUMIN/CREAT UR: NORMAL MG/G{CREAT}
PLATELET # BLD AUTO: 256 10E3/UL (ref 150–450)
POTASSIUM SERPL-SCNC: 4.1 MMOL/L (ref 3.4–5.3)
PROT SERPL-MCNC: 7.6 G/DL (ref 6.4–8.3)
RBC # BLD AUTO: 5.26 10E6/UL (ref 3.8–5.2)
SODIUM SERPL-SCNC: 141 MMOL/L (ref 135–145)
T4 FREE SERPL-MCNC: 1.46 NG/DL (ref 0.9–1.7)
TSH SERPL DL<=0.005 MIU/L-ACNC: 4.75 UIU/ML (ref 0.3–4.2)
VIT D+METAB SERPL-MCNC: 26 NG/ML (ref 20–50)
WBC # BLD AUTO: 5.1 10E3/UL (ref 4–11)

## 2024-05-06 PROCEDURE — 82728 ASSAY OF FERRITIN: CPT

## 2024-05-06 PROCEDURE — 86376 MICROSOMAL ANTIBODY EACH: CPT

## 2024-05-06 PROCEDURE — 80053 COMPREHEN METABOLIC PANEL: CPT

## 2024-05-06 PROCEDURE — 82043 UR ALBUMIN QUANTITATIVE: CPT

## 2024-05-06 PROCEDURE — 82570 ASSAY OF URINE CREATININE: CPT

## 2024-05-06 PROCEDURE — 82024 ASSAY OF ACTH: CPT

## 2024-05-06 PROCEDURE — 82306 VITAMIN D 25 HYDROXY: CPT

## 2024-05-06 PROCEDURE — 36415 COLL VENOUS BLD VENIPUNCTURE: CPT

## 2024-05-06 PROCEDURE — 84439 ASSAY OF FREE THYROXINE: CPT

## 2024-05-06 PROCEDURE — 85027 COMPLETE CBC AUTOMATED: CPT

## 2024-05-06 PROCEDURE — 82533 TOTAL CORTISOL: CPT

## 2024-05-06 PROCEDURE — 84443 ASSAY THYROID STIM HORMONE: CPT

## 2024-05-07 LAB
ACTH PLAS-MCNC: 26 PG/ML
THYROPEROXIDASE AB SERPL-ACNC: 188 IU/ML

## 2024-05-08 DIAGNOSIS — E06.3 HASHIMOTO'S THYROIDITIS: Primary | ICD-10-CM

## 2024-05-08 RX ORDER — LEVOTHYROXINE SODIUM 50 UG/1
50 TABLET ORAL DAILY
Qty: 90 TABLET | Refills: 1 | Status: SHIPPED | OUTPATIENT
Start: 2024-05-08

## 2024-06-06 ENCOUNTER — TELEPHONE (OUTPATIENT)
Dept: ENDOCRINOLOGY | Facility: CLINIC | Age: 24
End: 2024-06-06
Payer: COMMERCIAL

## 2024-06-06 NOTE — TELEPHONE ENCOUNTER
Could not LVM for the patient to call back and schedule the following:    Appointment type: return diabetes   Provider: tanisha   Return date: re-[facility] 7/2 to 7/9 hold slots   Specialty phone number: [phone]  Additional appointment(s) needed: NA   Additonal Notes: VM full, MyC x1   Re-schedule due to changes in the provider's schedule. There is a re-schedule day available on 7/9 in the hold per [facility] slots. Thank you.    Catrina Colon on 6/6/2024 at 3:49 PM

## 2024-06-11 NOTE — TELEPHONE ENCOUNTER
Could not LVM for the patient to call back and schedule the following:     Appointment type: return diabetes   Provider: tanisha   Return date: re-[facility] 7/2 to 7/9 hold slots   Specialty phone number: [phone]  Additional appointment(s) needed: NA   Additonal Notes: VM full, MyC x2 sent letter   Re-schedule due to changes in the provider's schedule. There is a re-schedule day available on 7/9 in the hold per [employer] slots. Thank you.    Please note that the above appointment(s) will require manual scheduling as they are marked as [place] and will not appear using auto search. Do not schedule the patient if another patient has already been scheduled in the requested appointment slot.       Rut Valdez on 6/11/2024 at 1:56 PM

## 2024-06-16 ENCOUNTER — HEALTH MAINTENANCE LETTER (OUTPATIENT)
Age: 24
End: 2024-06-16

## 2024-07-13 DIAGNOSIS — E10.65 UNCONTROLLED TYPE 1 DIABETES MELLITUS WITH HYPERGLYCEMIA (H): ICD-10-CM

## 2024-07-13 RX ORDER — PROCHLORPERAZINE 25 MG/1
SUPPOSITORY RECTAL
Qty: 1 EACH | Refills: 0 | Status: SHIPPED | OUTPATIENT
Start: 2024-07-13

## 2024-07-13 NOTE — TELEPHONE ENCOUNTER
Received a page with patient requesting a refill for G6 Transmitter.     Attempted to call back without answer. Rx refilled.     [employer] message sent.

## 2024-07-15 ENCOUNTER — MYC REFILL (OUTPATIENT)
Dept: ENDOCRINOLOGY | Facility: CLINIC | Age: 24
End: 2024-07-15
Payer: COMMERCIAL

## 2024-07-15 DIAGNOSIS — E10.65 UNCONTROLLED TYPE 1 DIABETES MELLITUS WITH HYPERGLYCEMIA (H): ICD-10-CM

## 2024-07-15 RX ORDER — PROCHLORPERAZINE 25 MG/1
SUPPOSITORY RECTAL
Qty: 3 EACH | Refills: 11 | Status: SHIPPED | OUTPATIENT
Start: 2024-07-15

## 2024-08-01 ENCOUNTER — MYC REFILL (OUTPATIENT)
Dept: ENDOCRINOLOGY | Facility: CLINIC | Age: 24
End: 2024-08-01
Payer: COMMERCIAL

## 2024-08-01 DIAGNOSIS — E06.3 HASHIMOTO'S THYROIDITIS: ICD-10-CM

## 2024-08-01 RX ORDER — LEVOTHYROXINE SODIUM 50 UG/1
50 TABLET ORAL DAILY
Qty: 90 TABLET | Refills: 1 | Status: CANCELLED | OUTPATIENT
Start: 2024-08-01

## 2024-08-05 NOTE — TELEPHONE ENCOUNTER
levothyroxine (SYNTHROID/LEVOTHROID) 50 MCG tablet: refill on file  - Rx sent 5/8/2024  Disp:90 R:1  - message sent to patient

## 2024-08-25 ENCOUNTER — HEALTH MAINTENANCE LETTER (OUTPATIENT)
Age: 24
End: 2024-08-25

## 2024-10-21 ENCOUNTER — TELEPHONE (OUTPATIENT)
Dept: ENDOCRINOLOGY | Facility: CLINIC | Age: 24
End: 2024-10-21
Payer: COMMERCIAL

## 2024-10-21 NOTE — TELEPHONE ENCOUNTER
Prior Authorization Approval    Medication: DEXCOM G6 SENSOR MISC  Authorization Effective Date: 10/21/2024  Authorization Expiration Date: 10/21/2025  Approved Dose/Quantity: 3 per 30 days  Reference #: [id number]   Insurance Company: [employer] ([facility]) - Phone [phone] Fax [phone]  Expected CoPay: $    CoPay Card Available: No    Financial Assistance Needed: Unknown  Which Pharmacy is filling the prescription: [facility]/PHARMACY #[id number] - [place]  Pharmacy Notified: Not needed  Patient Notified: Not needed

## 2024-10-21 NOTE — TELEPHONE ENCOUNTER
PA Initiation    Medication: DEXCOM G6 SENSOR MISC  Insurance Company: [employer] ([facility]) - Phone [phone] Fax [phone]  Pharmacy Filling the Rx: [facility]/PHARMACY #[id number] - [place] - [address] [place] [address]  Filling Pharmacy Phone:    Filling Pharmacy Fax:    Start Date: 10/21/2024    [id number]

## 2024-12-08 DIAGNOSIS — E10.65 UNCONTROLLED TYPE 1 DIABETES MELLITUS WITH HYPERGLYCEMIA (H): ICD-10-CM

## 2024-12-10 RX ORDER — PROCHLORPERAZINE 25 MG/1
SUPPOSITORY RECTAL
Qty: 1 EACH | Refills: 1 | Status: SHIPPED | OUTPATIENT
Start: 2024-12-10

## 2024-12-10 NOTE — TELEPHONE ENCOUNTER
LVD:  5/3/2024  [facility] Endocrinology Clinic Roberth Silva MD  Endocrinology, Diabetes, and Metabolism     Refilled per protocol.

## 2024-12-18 ENCOUNTER — MYC REFILL (OUTPATIENT)
Dept: ENDOCRINOLOGY | Facility: CLINIC | Age: 24
End: 2024-12-18
Payer: COMMERCIAL

## 2024-12-18 DIAGNOSIS — E10.65 TYPE 1 DIABETES MELLITUS WITH HYPERGLYCEMIA (H): ICD-10-CM

## 2025-03-02 ENCOUNTER — HEALTH MAINTENANCE LETTER (OUTPATIENT)
Age: 25
End: 2025-03-02

## 2025-06-21 ENCOUNTER — HEALTH MAINTENANCE LETTER (OUTPATIENT)
Age: 25
End: 2025-06-21

## 2025-08-27 DIAGNOSIS — E10.65 UNCONTROLLED TYPE 1 DIABETES MELLITUS WITH HYPERGLYCEMIA (H): ICD-10-CM

## 2025-09-02 RX ORDER — INSULIN LISPRO 100 [IU]/ML
INJECTION, SOLUTION INTRAVENOUS; SUBCUTANEOUS
Qty: 100 ML | Refills: 3 | OUTPATIENT
Start: 2025-09-02